# Patient Record
Sex: MALE | Race: WHITE | Employment: FULL TIME | URBAN - METROPOLITAN AREA
[De-identification: names, ages, dates, MRNs, and addresses within clinical notes are randomized per-mention and may not be internally consistent; named-entity substitution may affect disease eponyms.]

---

## 2020-12-26 ENCOUNTER — TRANSFERRED RECORDS (OUTPATIENT)
Dept: HEALTH INFORMATION MANAGEMENT | Facility: CLINIC | Age: 50
End: 2020-12-26

## 2020-12-26 LAB — RETINOPATHY: NORMAL

## 2021-04-03 ENCOUNTER — OFFICE VISIT (OUTPATIENT)
Dept: URGENT CARE | Facility: URGENT CARE | Age: 51
End: 2021-04-03
Payer: COMMERCIAL

## 2021-04-03 VITALS
HEIGHT: 67 IN | SYSTOLIC BLOOD PRESSURE: 110 MMHG | RESPIRATION RATE: 20 BRPM | DIASTOLIC BLOOD PRESSURE: 76 MMHG | BODY MASS INDEX: 26.97 KG/M2 | HEART RATE: 94 BPM | WEIGHT: 171.8 LBS | TEMPERATURE: 98.3 F | OXYGEN SATURATION: 96 %

## 2021-04-03 DIAGNOSIS — E11.69 TYPE 2 DIABETES MELLITUS WITH OTHER SPECIFIED COMPLICATION, WITHOUT LONG-TERM CURRENT USE OF INSULIN (H): ICD-10-CM

## 2021-04-03 DIAGNOSIS — R55 VASOVAGAL SYNCOPE: Primary | ICD-10-CM

## 2021-04-03 LAB
ANION GAP SERPL CALCULATED.3IONS-SCNC: 2 MMOL/L (ref 3–14)
BUN SERPL-MCNC: 14 MG/DL (ref 7–30)
CALCIUM SERPL-MCNC: 9 MG/DL (ref 8.5–10.1)
CHLORIDE SERPL-SCNC: 100 MMOL/L (ref 94–109)
CO2 SERPL-SCNC: 28 MMOL/L (ref 20–32)
CREAT SERPL-MCNC: 0.82 MG/DL (ref 0.66–1.25)
GFR SERPL CREATININE-BSD FRML MDRD: >90 ML/MIN/{1.73_M2}
GLUCOSE SERPL-MCNC: 396 MG/DL (ref 70–99)
HBA1C MFR BLD: >15 % (ref 0–5.6)
POTASSIUM SERPL-SCNC: 4.3 MMOL/L (ref 3.4–5.3)
SODIUM SERPL-SCNC: 130 MMOL/L (ref 133–144)
TSH SERPL DL<=0.005 MIU/L-ACNC: 1.26 MU/L (ref 0.4–4)

## 2021-04-03 PROCEDURE — 83036 HEMOGLOBIN GLYCOSYLATED A1C: CPT | Performed by: INTERNAL MEDICINE

## 2021-04-03 PROCEDURE — 99205 OFFICE O/P NEW HI 60 MIN: CPT | Performed by: INTERNAL MEDICINE

## 2021-04-03 PROCEDURE — 93000 ELECTROCARDIOGRAM COMPLETE: CPT | Performed by: INTERNAL MEDICINE

## 2021-04-03 PROCEDURE — 84443 ASSAY THYROID STIM HORMONE: CPT | Performed by: INTERNAL MEDICINE

## 2021-04-03 PROCEDURE — 36415 COLL VENOUS BLD VENIPUNCTURE: CPT | Performed by: INTERNAL MEDICINE

## 2021-04-03 PROCEDURE — 80048 BASIC METABOLIC PNL TOTAL CA: CPT | Performed by: INTERNAL MEDICINE

## 2021-04-03 ASSESSMENT — MIFFLIN-ST. JEOR: SCORE: 1597.91

## 2021-04-03 NOTE — PROGRESS NOTES
"    Assessment & Plan     Vasovagal syncope  Considered extensive differential diagnosis of this type of fainting event including seizure, vasovagal syncope, cardiac arrhythmia, myocardial ischemia, valve disease, other structural heart disease, carotid disease, VBI, dehydration.  The history is very consistent with a vasovagal event.  Investigated for underlying metabolic imbalances that could contribute and identified significant hyperglycemia with pseudohyponatremia.  This is a new diagnosis of type 2 diabetes mellitus.  Fortunately, there is no evidence of structural heart disease or cardiac arrhythmia.  Will start metformin.  He has a f/u appointment already scheduled with Dr. Zimmer on 4/8.  Will get some diabetes labs today. Since he isn't fasting, I have advised that he come in fasting for his visit on 4/8.    - EKG 12-lead complete w/read - Clinics  - Basic metabolic panel  (Ca, Cl, CO2, Creat, Gluc, K, Na, BUN)    Type 2 diabetes mellitus with other specified complication, without long-term current use of insulin (H)  - metFORMIN (GLUCOPHAGE) 500 MG tablet; Take 1 tablet (500 mg) by mouth 2 times daily (with meals)  - Hemoglobin A1c  - TSH with free T4 reflex      Ashu Chatterjee MD  [facility] URGENT CARE [facility]    Subjective     HPI     He was standing outside having a conversation with a friend when he passed out.  States that he had been \"up all night\" because he is a shift worker (works third shift).  His typical pattern is to go to sleep around 6:30 AM when he comes home and then wakes around 1:00 PM and then might nap in the later afternoon.  States that he hadn't consumed too much to eat in the past 16 hours (other than some soup and Crystal Lite).  He typically is eating once per day.  Will consume caffeinated beverage.      Prior to fainting, he denies any prodromal symptoms.  Denies chest pain, shortness of breath, palpitations, fluttering.  No diaphoresis, blurry vision or nausea.  " "States that he was unconscious very briefly on both occasions that he fainted. He regained full alertness within a minute or less. No convulsive movements, no loss of bowel or bladder continence.  He subsequently had something to eat.  States that right now he feels fine.     Exercise is primarily at work; he is a  and there is a lot of walking in that job.      PMH:  Negative for chronic conditions   MEDS: taking keerthi    Review of Systems   CONSTITUTIONAL: NEGATIVE for fever, chills, change in weight  EYES: NEGATIVE for vision changes or irritation  ENT/MOUTH: NEGATIVE for ear, mouth and throat problems  RESP: NEGATIVE for significant cough or SOB  CV: NEGATIVE for chest pain, palpitations or peripheral edema  GI: NEGATIVE for nausea, abdominal pain, heartburn, or change in bowel habits  NEURO: NEGATIVE for convulsive movements, disorientation, speech problems, loss of coordination, weakness, numbness, tremor, memory problems  ENDOCRINE: NEGATIVE for temperature intolerance, skin/hair changes      Objective    /76   Pulse 94   Temp 98.3  F (36.8  C) (Tympanic)   Resp 20   Ht 1.702 m (5' 7\")   Wt 77.9 kg (171 lb 12.8 oz)   SpO2 96%   BMI 26.91 kg/m    Body mass index is 26.91 kg/m .  Physical Exam   GENERAL APPEARANCE: healthy, alert and no distress  EYES: Eyes grossly normal to inspection, PERRL, conjunctivae and sclerae normal and brief torsional nystagmus with upward gaze to the right  HENT: ear canals and TM's normal and nose and mouth without ulcers or lesions  NECK: no adenopathy, no asymmetry, masses, or scars and thyroid normal to palpation  RESP: lungs clear to auscultation - no rales, rhonchi or wheezes  CV: regular rates and rhythm, normal S1 S2, no S3 or S4 and no murmur, click or rub  ABDOMEN: soft, nontender, without hepatosplenomegaly or masses and bowel sounds normal  NEURO: CN 2-12 intact, strength 5/5 throughout, normal DTRs in patellar and biceps tendons bilaterally, " sensation grossly intact, no ataxia on finger-to-nose testing, mental status alert and oriented x 3    EKG - Reviewed and interpreted by me appears normal, NSR, normal axis, normal intervals, no acute ST/T changes c/w ischemia, no LVH by voltage criteria, unchanged from previous tracings  Results for orders placed or performed in visit on 04/03/21 (from the past 24 hour(s))   Basic metabolic panel  (Ca, Cl, CO2, Creat, Gluc, K, Na, BUN)   Result Value Ref Range    Sodium 130 (L) 133 - 144 mmol/L    Potassium 4.3 3.4 - 5.3 mmol/L    Chloride 100 94 - 109 mmol/L    Carbon Dioxide 28 20 - 32 mmol/L    Anion Gap 2 (L) 3 - 14 mmol/L    Glucose 396 (H) 70 - 99 mg/dL    Urea Nitrogen 14 7 - 30 mg/dL    Creatinine 0.82 0.66 - 1.25 mg/dL    GFR Estimate >90 >60 mL/min/[1.73_m2]    GFR Estimate If Black >90 >60 mL/min/[1.73_m2]    Calcium 9.0 8.5 - 10.1 mg/dL

## 2021-04-03 NOTE — Clinical Note
Yesenia Zimmer,    This gentleman sees you on 4/8 for a physical.  He came in today for syncope and we diagnosed new onset diabetes (presumably type 2).  He has no PMH and is on no medications.  I got some labs but couldn't do all of the diabetes labs.  Told him to come in fasting on 4/8 but I also thought that you might want him to come in for a lab only prior to that.    Thank you.  Ashu Chatterjee MD

## 2021-04-04 ENCOUNTER — NURSE TRIAGE (OUTPATIENT)
Dept: NURSING | Facility: CLINIC | Age: 51
End: 2021-04-04

## 2021-04-04 NOTE — PATIENT INSTRUCTIONS
Your blood sugar of 396 indicates a diagnosis of type 2 diabetes mellitus.    This is a likely contributor to your fainting spell.  Will be important to get your blood sugars controlled to prevent other problems from developing.  Start a medication called metformin to begin the process of reducing blood sugar.  Additional blood tests today.  For you visit with Dr. Zimmer on April 8, I suggest that you come in fasting (nothing to eat or drink other than water and/or black coffee x 12 hours prior to the appointment).  This will allow for some other important testing to be completed.

## 2021-04-04 NOTE — TELEPHONE ENCOUNTER
"Morro asks for his lab results. FNA read him message from  Provider:    \"When he calls back, please let him know that the hemoglobin A1C test confirms the diagnosis of diabetes mellitus.  Most important next step will be follow-up on April 8th.    In the meantime, I would advise that he eliminate sugar from his diet and increase his intake of protein (meats, dairy, eggs, legumes) and his intake of vegetables.  Fruits are a source of sugar so I would suggest limiting fruits.  In terms of starches/carbohydrates, I would suggest that he reduce or eliminate eating any white bread products and reduce his intake of breads.       Thank you.  Ashu Chatterjee MD\"    He verbalized understanding and will see PCP on 4/8.    Ale Dupree RN/[facility] Nurse Advisor        Additional Information    Health Information question, no triage required and triager able to answer question    Protocols used: INFORMATION ONLY CALL-A-AH    "

## 2021-04-06 PROBLEM — E11.65 TYPE 2 DIABETES MELLITUS WITH HYPERGLYCEMIA, WITHOUT LONG-TERM CURRENT USE OF INSULIN (H): Status: ACTIVE | Noted: 2021-04-06

## 2021-04-08 ENCOUNTER — OFFICE VISIT (OUTPATIENT)
Dept: INTERNAL MEDICINE | Facility: CLINIC | Age: 51
End: 2021-04-08
Payer: COMMERCIAL

## 2021-04-08 VITALS
TEMPERATURE: 97.5 F | SYSTOLIC BLOOD PRESSURE: 120 MMHG | RESPIRATION RATE: 16 BRPM | HEIGHT: 66 IN | HEART RATE: 72 BPM | BODY MASS INDEX: 27.64 KG/M2 | DIASTOLIC BLOOD PRESSURE: 82 MMHG | WEIGHT: 172 LBS | OXYGEN SATURATION: 99 %

## 2021-04-08 DIAGNOSIS — Z12.11 ENCOUNTER FOR SCREENING FOR COLORECTAL MALIGNANT NEOPLASM: ICD-10-CM

## 2021-04-08 DIAGNOSIS — E11.65 TYPE 2 DIABETES MELLITUS WITH HYPERGLYCEMIA, WITHOUT LONG-TERM CURRENT USE OF INSULIN (H): ICD-10-CM

## 2021-04-08 DIAGNOSIS — Z00.00 ROUTINE GENERAL MEDICAL EXAMINATION AT A HEALTH CARE FACILITY: Primary | ICD-10-CM

## 2021-04-08 DIAGNOSIS — Z12.12 ENCOUNTER FOR SCREENING FOR COLORECTAL MALIGNANT NEOPLASM: ICD-10-CM

## 2021-04-08 DIAGNOSIS — Z23 ENCOUNTER FOR IMMUNIZATION: ICD-10-CM

## 2021-04-08 LAB
CHOLEST SERPL-MCNC: 171 MG/DL
CREAT UR-MCNC: 116 MG/DL
HDLC SERPL-MCNC: 43 MG/DL
LDLC SERPL CALC-MCNC: 109 MG/DL
MICROALBUMIN UR-MCNC: 16 MG/L
MICROALBUMIN/CREAT UR: 13.71 MG/G CR (ref 0–17)
NONHDLC SERPL-MCNC: 128 MG/DL
TRIGL SERPL-MCNC: 98 MG/DL

## 2021-04-08 PROCEDURE — 80061 LIPID PANEL: CPT | Performed by: INTERNAL MEDICINE

## 2021-04-08 PROCEDURE — 99396 PREV VISIT EST AGE 40-64: CPT | Mod: 25 | Performed by: INTERNAL MEDICINE

## 2021-04-08 PROCEDURE — 82043 UR ALBUMIN QUANTITATIVE: CPT | Performed by: INTERNAL MEDICINE

## 2021-04-08 PROCEDURE — 90715 TDAP VACCINE 7 YRS/> IM: CPT | Performed by: INTERNAL MEDICINE

## 2021-04-08 PROCEDURE — 99214 OFFICE O/P EST MOD 30 MIN: CPT | Mod: 25 | Performed by: INTERNAL MEDICINE

## 2021-04-08 PROCEDURE — 36415 COLL VENOUS BLD VENIPUNCTURE: CPT | Performed by: INTERNAL MEDICINE

## 2021-04-08 PROCEDURE — 99207 PR FOOT EXAM NO CHARGE: CPT | Mod: 25 | Performed by: INTERNAL MEDICINE

## 2021-04-08 PROCEDURE — 90471 IMMUNIZATION ADMIN: CPT | Performed by: INTERNAL MEDICINE

## 2021-04-08 RX ORDER — GLIPIZIDE 5 MG/1
5 TABLET ORAL
Qty: 30 TABLET | Refills: 1 | Status: SHIPPED | OUTPATIENT
Start: 2021-04-08 | End: 2021-05-06

## 2021-04-08 ASSESSMENT — MIFFLIN-ST. JEOR: SCORE: 1582.94

## 2021-04-08 NOTE — PATIENT INSTRUCTIONS
- Metformin take 1,000mg twice a day (so 2,000mg total daily dose)  - Glipizide prescribed. Take with food.  - Diabetic educator referral placed  - Sign-up for [email or web address] and I will send you a message on [email or web address] when I am able to look at the results of your tests from today    Preventive Health Recommendations  Male Ages 50 - 64    Yearly exam:             See your health care provider every year in order to  o   Review health changes.   o   Discuss preventive care.    o   Review your medicines if your doctor has prescribed any.     Have a cholesterol test every 5 years, or more frequently if you are at risk for high cholesterol/heart disease.     Have a diabetes test (fasting glucose) every three years. If you are at risk for diabetes, you should have this test more often.     Have a colonoscopy at age 50, or have a yearly FIT test (stool test). These exams will check for colon cancer.      Talk with your health care provider about whether or not a prostate cancer screening test (PSA) is right for you.    You should be tested each year for STDs (sexually transmitted diseases), if you re at risk.     Shots: Get a flu shot each year. Get a tetanus shot every 10 years.     Nutrition:    Eat at least 5 servings of fruits and vegetables daily.     Eat whole-grain bread, whole-wheat pasta and brown rice instead of white grains and rice.     Get adequate Calcium and Vitamin D.     Lifestyle    Exercise for at least 150 minutes a week (30 minutes a day, 5 days a week). This will help you control your weight and prevent disease.     Limit alcohol to one drink per day.     No smoking.     Wear sunscreen to prevent skin cancer.     See your dentist every six months for an exam and cleaning.     See your eye doctor every 1 to 2 years.

## 2021-04-08 NOTE — LETTER
[facility]  [address] [address]  Phone: [phone]   4/8/2021      Morro S Trent  [address] [facility] [address]        Dear Mr. Morro Newman:    I am writing to inform you of the results of the laboratory tests you had done recently. Your urine study shows that you do not have any protein in your urine, which is good news! Your cholesterol looks pretty good too. Please be sure to establish care with a provider at the [facility] some time in May 2021.    Thank you for allowing me to participate in your care. If you have any further questions or problems, please contact me via our nurse line at [phone]. An even easier way to get ahold of members of your care team is through [employer], which you can sign up for at [email or web address] using your personalized activation code [email or web address] [employer] is not only a great way to communicate with us, but also allows you to see your full results.      Sincerely,        Arnaldo Manuel MD, MPH  Department of Internal Medicine  [facility]

## 2021-04-08 NOTE — PROGRESS NOTES
SUBJECTIVE:   CC: Morro Newman is an 50 year old male who presents for preventive health visit.     Patient has been advised of split billing requirements and indicates understanding: Yes     Answers for HPI/ROS submitted by the patient on 4/8/2021   Annual Exam:  Frequency of exercise:: None  Getting at least 3 servings of Calcium per day:: Yes  Diet:: Regular (no restrictions)  Taking medications regularly:: No  Medication side effects:: None  Bi-annual eye exam:: Yes  Dental care twice a year:: Yes  Sleep apnea or symptoms of sleep apnea:: None  Additional concerns today:: No  Barriers to taking medications:: None    Morro presents today for a physical exam. We also discussed the following issues:    -New diagnosis of diabetes: Morro had an incident where he passed out. He presented to  over the weekend and labs showed uncontrolled diabetes with an A1c >15%. This is a new diagnosis. He reports he does not interact with the healthcare system regularly. His last interaction was to get a tetanus shot ~20 years ago. He reports drinking a lot of soda, Monster, eating candy, etc. He has felt dehydrated. He is tolerating the metformin well. He has ruled out injecting himself with anything at this time and is not interested in insulin therapy.    -HCM: Never heard of a colonoscopy, not UTD on most/all shots, only lab work was from  this past weekend    Today's PHQ-2 Score: 0  PHQ-2 Score: 0    PHQ-2 ( 1999 Pfizer) 4/8/2021   Q1: Little interest or pleasure in doing things 0   Q2: Feeling down, depressed or hopeless 0   PHQ-2 Score 0   Q1: Little interest or pleasure in doing things Not at all   Q2: Feeling down, depressed or hopeless Not at all   PHQ-2 Score 0     Abuse: Current or Past(Physical, Sexual or Emotional)- No  Do you feel safe in your environment? Yes    Social History     Tobacco Use     Smoking status: Light Tobacco Smoker     Smokeless tobacco: Never Used     Tobacco comment: Once a month on  "average.   Substance Use Topics     Alcohol use: Not on file     If you drink alcohol do you typically have >3 drinks per day or >7 drinks per week? No                      Last PSA: No results found for: PSA    Reviewed orders with patient. Reviewed health maintenance and updated orders accordingly - Yes    Labs reviewed in EPIC    Reviewed and updated as needed this visit by clinical staff  Tobacco  Allergies  Meds  Problems  Med Hx  Surg Hx  Fam Hx          Reviewed and updated as needed this visit by Provider  Tobacco  Allergies  Meds  Problems  Med Hx  Surg Hx  Fam Hx           ROS:  CONSTITUTIONAL: NEGATIVE for fever, chills, positive for change in weight  INTEGUMENTARY/SKIN: NEGATIVE for worrisome rashes, moles or lesions  EYES: NEGATIVE for vision changes or irritation  ENT: NEGATIVE for ear, mouth and throat problems  RESP: NEGATIVE for significant cough or SOB  CV: NEGATIVE for chest pain, palpitations or peripheral edema  GI: NEGATIVE for nausea, abdominal pain, heartburn, or change in bowel habits   male: negative for dysuria, hematuria, positive for polyuria  MUSCULOSKELETAL: NEGATIVE for significant arthralgias or myalgia  NEURO: NEGATIVE for weakness, dizziness or paresthesias except recent fall  PSYCHIATRIC: NEGATIVE for changes in mood or affect    OBJECTIVE:   /82   Pulse 72   Temp 97.5  F (36.4  C) (Temporal)   Resp 16   Ht 1.676 m (5' 6\")   Wt 78 kg (172 lb)   SpO2 99%   BMI 27.76 kg/m       EXAM:  GENERAL: Alert and in no distress.  EYES: Conjunctivae/corneas clear. EOMs grossly intact  HENT: NC/AT, facies symmetric. Neck supple.  RESP: CTAB. No w/r/r.  CV: RRR, no m/r/g.  GI: NT, ND, without rebound or guarding, no CVA tenderness  MSK: No cyanosis, clubbing or edema noted bilaterally in upper and/or lower extremities  FOOT EXAM: No ulcers/sores. DP pulses 2+ bilaterally. Sensation to monofilament intact.  SKIN: No significant ulcers, lesions or rashes on the " visualized portions of the skin  NEURO: Alert. Oriented. Gait normal. Sensation grossly WNL.  PSYCH: Linear thought process. Speech normal rate and volume. No tangential thoughts, hallucinations, or delusions.    Diagnostic Test Results: Labs reviewed in Epic    ASSESSMENT/PLAN:   1. Routine general medical examination at a health care facility  Reviewed PMH. Discussed healthcare maintenance issues, including cancer screenings (colonoscopies), relevant immunizations, and cardiac risk factor screenings such as for cholesterol, HTN, and DM.    2. Type 2 diabetes mellitus with hyperglycemia, without long-term current use of insulin (H)  I had a long discussion with Morro today regarding this new diagnosis. I explained what diabetes is, why we care so much about it, short and long term risks of undertreating it, and how we treat it. Throughout the discussion, he was adamant that he was not interested in any injectable medications (including insulin) at this time. I explained that he is in charge of his body, but that my medical recommendation is to start lantus insulin to help bring down his BG. He again declined. He was willing to uptitrate the metformin and start glipizide. Discussed the risks of hypoglycemia with glipizide and how to cope with it, though don't anticipate that to be an issue given his BG and A1c. Discussed refarral to diabetic education and he accepted. Foot exam normal today. Eye exam referral placed. We'll check UPC and lipids today. Of note, Morro states he lives quite close to the [facility] and would prefer to get care there. I explained that he will need frequent, consistent follow-up over the next few months as we try to get his diabetes under control and that he should establish at a location where he feels that is most feasible. He plans to reach out to [facility] to schedule a follow-up appt with a provider there in 4 weeks. I recommended he check his BG and bring in that log to that  "provider.  - Lipid Profile  - FOOT EXAM  NO CHARGE [93558.114]  - EYE ADULT REFERRAL; Future  - Albumin Random Urine Quantitative with Creat Ratio  - AMBULATORY ADULT DIABETES EDUCATOR REFERRAL; Future  - metFORMIN (GLUCOPHAGE) 1000 MG tablet; Take 1 tablet (1,000 mg) by mouth 2 times daily (with meals)  Dispense: 180 tablet; Refill: 3  - blood glucose monitoring (NO BRAND SPECIFIED) meter device kit; Use to test blood sugar as directed.  Dispense: 1 kit; Refill: 0  - glipiZIDE (GLUCOTROL) 5 MG tablet; Take 1 tablet (5 mg) by mouth daily with food  Dispense: 30 tablet; Refill: 1    3. Encounter for screening for colorectal malignant neoplasm  Explained what a colonoscopy is and why we recommend them. He agreed to proceed. Referral placed.  - GASTROENTEROLOGY ADULT REF PROCEDURE ONLY; Future    4. Encounter for immunization  Needs multiple vaccines, but agreed to Tdap today.  - TDAP VACCINE (Adacel, Boostrix)  [[id number]]    Patient has been advised of split billing requirements and indicates understanding: Yes     COUNSELING:  Reviewed preventive health counseling, as reflected in patient instructions       Regular exercise       Healthy diet/nutrition       Immunizations    Vaccinated for: TDAP       Colon cancer screening    Estimated body mass index is 27.76 kg/m  as calculated from the following:    Height as of this encounter: 1.676 m (5' 6\").    Weight as of this encounter: 78 kg (172 lb).    He reports that he has been smoking. He has never used smokeless tobacco.     Tobacco Cessation Action Plan:   Information offered: Patient not interested at this time    Counseling Resources:  ATP IV Guidelines  Pooled Cohorts Equation Calculator  FRAX Risk Assessment  ICSI Preventive Guidelines  Dietary Guidelines for Americans, 2010  USDA's MyPlate  ASA Prophylaxis  Lung CA Screening    Arnaldo Zimmer MD  [facility]  "

## 2021-04-29 ENCOUNTER — PATIENT OUTREACH (OUTPATIENT)
Dept: EDUCATION SERVICES | Facility: CLINIC | Age: 51
End: 2021-04-29
Payer: COMMERCIAL

## 2021-04-29 DIAGNOSIS — E11.65 TYPE 2 DIABETES MELLITUS WITH HYPERGLYCEMIA, WITHOUT LONG-TERM CURRENT USE OF INSULIN (H): ICD-10-CM

## 2021-04-29 PROCEDURE — G0108 DIAB MANAGE TRN  PER INDIV: HCPCS | Mod: 95

## 2021-04-29 RX ORDER — BLOOD SUGAR DIAGNOSTIC
STRIP MISCELLANEOUS
Qty: 100 STRIP | Refills: 11 | Status: SHIPPED | OUTPATIENT
Start: 2021-04-29 | End: 2021-08-03

## 2021-04-29 RX ORDER — LANCETS
EACH MISCELLANEOUS
Qty: 100 EACH | Refills: 6 | Status: SHIPPED | OUTPATIENT
Start: 2021-04-29 | End: 2021-08-03

## 2021-04-29 NOTE — PATIENT INSTRUCTIONS
Diabetes Support Resources:  Websites: American Diabetes Association: www.diabetes.org     Aim for 60 grams of carb per meal and 15 grams of carb per snack.     Check your blood sugar twice daily - when you wake up and before bed. Can occasionally check after meals occasionally.     Signs/symptoms of low blood sugar include (but are not limited to): sweating, shaking, feeling dizzy or weak, extreme hunger, headache, feeling crabby or confused, numbness or tingling of mouth/lips.  If you have these symptoms check your blood sugar if you can and then consume carbohydrate (sugar)  This is a helpful reminder on how to treat a blood sugar if you are low:  If your blood sugar is < 70 mg/dL, consume 15 grams of fast-acting carbohydrate (4 glucose tabs OR 4 oz juice or non-diet pop OR 2 Tbsp dried fruit OR 5-7 lifesavers OR 1 Tbsp sugar) and wait 15 minutes. Check blood sugar again and if not rising, repeat.  If your blood sugar is < 50 mg/dL, consume 30 grams of fast-acting carbohydrate (8 glucose tabs OR 8 oz juice or non-diet pop OR 4 Tbsp dried fruit OR 10-14 lifesavers OR 2 Tbsp sugar) and wait 15 minutes. Check blood sugar again and if not rising, repeat.       Bring blood glucose meter and logbook with you to all doctor and follow-up appointments.    Diabetes Education Telephone Visit Follow-up:    We realize your time is valuable and your health is important! We offer a convenient Telephone Visit follow up! It s a quick way to check in for a medication dose adjustment without having to come back to clinic as soon.    Telephone Visits are often covered by insurance. Please check with your insurance plan to see if this type of visit is covered. If not, the cost is less expensive than an office visit:      Up to 10 minutes (Code 91329): $30    11-20 minutes (Code 49938): $59    More than 20 minutes (Code 35779): $85    Talk with your Diabetes Educator if you want to learn more.      [facility]  Nutrition Services:  For Your Diabetes Education and Nutrition Appointments Call:  [phone]   For Diabetes Education or Nutrition Related Questions:   Phone: [phone]  Send [employer] Message   If you need a medication refill please contact your pharmacy. Please allow 3 business days for your refills to be completed.

## 2021-04-29 NOTE — PROGRESS NOTES
"    Diabetes Self-Management Education & Support  Type of Service: Telephone Visit    How would patient like to obtain AVS? De[place]        Presents for: Initial Assessment for new diagnosis    SUBJECTIVE/OBJECTIVE:  Presents for: Initial Assessment for new diagnosis  Accompanied by: Self  Diabetes education in the past 24mo: No  Focus of Visit: Monitoring  Diabetes type: Type 2  Date of diagnosis: new dx  Disease course: Improving  How confident are you filling out medical forms by yourself:: Not Assessed  Diabetes management related comments/concerns: Meter does not always work and says he does not always get enough blood.  Difficulty affording diabetes medication?: No  Difficulty affording diabetes testing supplies?: No  Cultural Influences/Ethnic Background:      Diabetes Symptoms & Complications:  Polydipsia: Yes  Symptom course: Improving  Weight trend: Stable  Complications assessed today?: Yes  Autonomic neuropathy: No  CVA: No  Heart disease: No  Nephropathy: No  Peripheral neuropathy: No  Peripheral Vascular Disease: No  Retinopathy: No    Patient Problem List and Family Medical History reviewed for relevant medical history, current medical status, and diabetes risk factors.    Vitals:  There were no vitals taken for this visit.  Estimated body mass index is 27.76 kg/m  as calculated from the following:    Height as of 4/8/21: 1.676 m (5' 6\").    Weight as of 4/8/21: 78 kg (172 lb).   Last 3 BP:   BP Readings from Last 3 Encounters:   04/08/21 120/82   04/03/21 110/76       History   Smoking Status     Light Tobacco Smoker   Smokeless Tobacco     Never Used     Comment: Once a month on average.       Labs:  Lab Results   Component Value Date    A1C >15.0 04/03/2021     Lab Results   Component Value Date     04/03/2021     Lab Results   Component Value Date     04/08/2021     HDL Cholesterol   Date Value Ref Range Status   04/08/2021 43 >39 mg/dL Final   ]  GFR Estimate   Date Value " Ref Range Status   04/03/2021 >90 >60 mL/min/[1.73_m2] Final     Comment:     Non  GFR Calc  Starting 12/18/2018, serum creatinine based estimated GFR (eGFR) will be   calculated using the Chronic Kidney Disease Epidemiology Collaboration   (CKD-EPI) equation.       GFR Estimate If Black   Date Value Ref Range Status   04/03/2021 >90 >60 mL/min/[1.73_m2] Final     Comment:      GFR Calc  Starting 12/18/2018, serum creatinine based estimated GFR (eGFR) will be   calculated using the Chronic Kidney Disease Epidemiology Collaboration   (CKD-EPI) equation.       Lab Results   Component Value Date    CR 0.82 04/03/2021     No results found for: MICROALBUMIN    Healthy Eating:  Healthy Eating Assessed Today: Yes  Cultural/Buddhist diet restrictions?: No  Meal planning/habits: Avoiding sweets  Meals include: Breakfast, Lunch, Dinner  Breakfast: fiber cereal and HB eggs  Lunch: almonds/peanuts  Dinner: shredded chicken and cheese  Snacks: beef jerkey, bananas, mangos  Beverages: Water, Coffee, Diet soda  Has patient met with a dietitian in the past?: No      Was drinking pop and candy before. Using sugar free creamer and diet sodas now. Buying everything unsweetened.  No monster drinks anymore.     Eats vegetables hear and there. Likes beans and rice and chicken or burger without the bun.     Being Active:  Being Active Assessed Today: Yes  Exercise:: Yes(walks 3-5 miles per day with work and during the day)    Monitoring:  Monitoring Assessed Today: Yes  Did patient bring glucose meter to appointment? : Yes  Blood Glucose Meter: Accu-chek  Times checking blood sugar at home (number): 2  Times checking blood sugar at home (per): Day  Blood glucose trend: Decreasing      131 this morning. 150s after eating usually. 114 mg/dL on call now.   128 yesterday morning.     Taking Medications:  Diabetes Medication(s)     Biguanides       metFORMIN (GLUCOPHAGE) 1000 MG tablet    Take 1 tablet (1,000  mg) by mouth 2 times daily (with meals)    Sulfonylureas       glipiZIDE (GLUCOTROL) 5 MG tablet    Take 1 tablet (5 mg) by mouth daily with food          Taking Medication Assessed Today: Yes  Current Treatments: Oral Medication (taken by mouth)  Problems taking diabetes medications regularly?: No  Diabetes medication side effects?: No    Problem Solving:  Problem Solving Assessed Today: Yes  Is the patient at risk for hypoglycemia?: Yes  Hypoglycemia Frequency: Never      Reducing Risks:  Reducing Risks Assessed Today: No    Healthy Coping:  Healthy Coping Assessed Today: Yes  Emotional response to diabetes: Ready to learn, Concern for health and well-being  Informal Support system:: Family, Spouse  Stage of change: ACTION (Actively working towards change)  Patient Activation Measure Survey Score:  No flowsheet data found.    Diabetes knowledge and skills assessment:   Patient is knowledgeable in diabetes management concepts related to: Monitoring    Patient needs further education on the following diabetes management concepts: Healthy Eating, Being Active, Taking Medication, Problem Solving, Reducing Risks and Healthy Coping    Based on learning assessment above, most appropriate setting for further diabetes education would be: Group class or Individual setting.      INTERVENTIONS:    Education provided today on:  AADE Self-Care Behaviors:  Healthy Eating: carbohydrate counting, consistency in amount, composition, and timing of food intake, portion control and label reading. Praised him on all his diet changes! Encouraged him to keep these up. He reports feeling better and okay with these changes. He wants to avoid insulin.   Being Active: relationship to blood glucose  Monitoring: purpose, proper technique, log and interpret results, individual blood glucose targets and frequency of monitoring  Taking Medication: action of prescribed medication and side effects of prescribed medications  Problem Solving: low  blood glucose - causes, signs/symptoms, treatment and prevention, carrying a carbohydrate source at all times and when to call health care provider    Opportunities for ongoing education and support in diabetes-self management were discussed.    Pt verbalized understanding of concepts discussed and recommendations provided today.       Education Materials Provided:  [facility] Understanding Diabetes Booklet will be mailed      ASSESSMENT:  Morro has made a number of positive changes since his dx. He has cut out the sugary drinks and candy. He is choosing higher fiber foods and eating protein with his meals. Praised him on all this!    His blood sugars are in target for before meals and after meals. Praised him on this. Focused on some tips for getting enough blood like milking the finger and maybe using a higher setting on his lancing device.     He was wondering about an organic insulin powder. I have never heard of this so I told him to bring it to his doctor appt next week.       Patient's most recent   Lab Results   Component Value Date    A1C >15.0 04/03/2021    is not meeting goal of <8.0    PLAN  See Patient Instructions for co-developed, patient-stated behavior change goals.  Ordered test strips and lancets for his meter (has an accu chek guide).   Check BG BID after waking up and before bed. Can occasionally check 2 hrs after a meal if helpful. Always check BG if sx of a low. Goal is  before meals and < 180 if 2 hrs after a meal.   Pt to contact us or his PCP if having low BG's.   No changes made to his Metformin or Glipizide today.   Bring organic insulin powder to the doctor next week to look at  AVS printed and provided to patient today. See Follow-Up section for recommended follow-up.    LAURA Bergeron CDE    Time Spent: 46 minutes  Encounter Type: Individual    Any diabetes medication dose changes were made via the CDE Protocol and Collaborative Practice Agreement with the patient's  referring provider. A copy of this encounter was shared with the provider.

## 2021-05-06 ENCOUNTER — OFFICE VISIT (OUTPATIENT)
Dept: FAMILY MEDICINE | Facility: CLINIC | Age: 51
End: 2021-05-06
Payer: COMMERCIAL

## 2021-05-06 VITALS
DIASTOLIC BLOOD PRESSURE: 82 MMHG | RESPIRATION RATE: 16 BRPM | TEMPERATURE: 97 F | BODY MASS INDEX: 27.32 KG/M2 | SYSTOLIC BLOOD PRESSURE: 126 MMHG | HEART RATE: 78 BPM | OXYGEN SATURATION: 100 % | HEIGHT: 66 IN | WEIGHT: 170 LBS

## 2021-05-06 DIAGNOSIS — N52.1 ERECTILE DYSFUNCTION DUE TO DISEASES CLASSIFIED ELSEWHERE: ICD-10-CM

## 2021-05-06 DIAGNOSIS — E11.65 TYPE 2 DIABETES MELLITUS WITH HYPERGLYCEMIA, WITHOUT LONG-TERM CURRENT USE OF INSULIN (H): Primary | ICD-10-CM

## 2021-05-06 PROCEDURE — 99207 PR FOOT EXAM NO CHARGE: CPT | Performed by: FAMILY MEDICINE

## 2021-05-06 PROCEDURE — 99214 OFFICE O/P EST MOD 30 MIN: CPT | Performed by: FAMILY MEDICINE

## 2021-05-06 RX ORDER — SILDENAFIL 100 MG/1
50-100 TABLET, FILM COATED ORAL DAILY PRN
Qty: 6 TABLET | Refills: 1 | Status: SHIPPED | OUTPATIENT
Start: 2021-05-06 | End: 2023-06-12

## 2021-05-06 RX ORDER — GLIPIZIDE 5 MG/1
5 TABLET ORAL
Qty: 30 TABLET | Refills: 1 | Status: SHIPPED | OUTPATIENT
Start: 2021-05-06 | End: 2021-06-01

## 2021-05-06 SDOH — ECONOMIC STABILITY: FOOD INSECURITY: WITHIN THE PAST 12 MONTHS, YOU WORRIED THAT YOUR FOOD WOULD RUN OUT BEFORE YOU GOT MONEY TO BUY MORE.: NOT ASKED

## 2021-05-06 SDOH — HEALTH STABILITY: MENTAL HEALTH: HOW OFTEN DO YOU HAVE A DRINK CONTAINING ALCOHOL?: MONTHLY OR LESS

## 2021-05-06 SDOH — ECONOMIC STABILITY: INCOME INSECURITY: HOW HARD IS IT FOR YOU TO PAY FOR THE VERY BASICS LIKE FOOD, HOUSING, MEDICAL CARE, AND HEATING?: NOT ASKED

## 2021-05-06 SDOH — HEALTH STABILITY: MENTAL HEALTH: HOW OFTEN DO YOU HAVE 6 OR MORE DRINKS ON ONE OCCASION?: NOT ASKED

## 2021-05-06 SDOH — ECONOMIC STABILITY: TRANSPORTATION INSECURITY
IN THE PAST 12 MONTHS, HAS THE LACK OF TRANSPORTATION KEPT YOU FROM MEDICAL APPOINTMENTS OR FROM GETTING MEDICATIONS?: NOT ASKED

## 2021-05-06 SDOH — ECONOMIC STABILITY: TRANSPORTATION INSECURITY
IN THE PAST 12 MONTHS, HAS LACK OF TRANSPORTATION KEPT YOU FROM MEETINGS, WORK, OR FROM GETTING THINGS NEEDED FOR DAILY LIVING?: NOT ASKED

## 2021-05-06 SDOH — HEALTH STABILITY: MENTAL HEALTH: HOW MANY STANDARD DRINKS CONTAINING ALCOHOL DO YOU HAVE ON A TYPICAL DAY?: 1 OR 2

## 2021-05-06 SDOH — ECONOMIC STABILITY: FOOD INSECURITY: WITHIN THE PAST 12 MONTHS, THE FOOD YOU BOUGHT JUST DIDN'T LAST AND YOU DIDN'T HAVE MONEY TO GET MORE.: NOT ASKED

## 2021-05-06 ASSESSMENT — MIFFLIN-ST. JEOR: SCORE: 1573.86

## 2021-05-06 NOTE — PROGRESS NOTES
"    Assessment & Plan     Type 2 diabetes mellitus with hyperglycemia, without long-term current use of insulin (H)  Blood sugars appear to be under dramatically better control at this time.  I have not made any changes to his medications but he did need a refill on the glipizide today.  He could probably cut his blood sugar checks back to once daily or 4-5 times per week.  I talked to him about the tests we used to measure improvement in diabetes and would suggest follow-up in 2 months with hemoglobin A1c testing at that time.  Other tests from his recent visit in urgent care and at the [facility] were reviewed in detail today.  - FOOT EXAM  - glipiZIDE (GLUCOTROL) 5 MG tablet; Take 1 tablet (5 mg) by mouth daily with food    Erectile dysfunction due to diseases classified elsewhere  Patient reports recent onset of diminished interest and ability in sex.  He was interested in trying Viagra so I issued a prescription for that today with discussion of side effects including but not limited to sudden vision loss.  If this does not help with things then would give consideration to checking hormone levels at the next visit.  - sildenafil (VIAGRA) 100 MG tablet; Take 0.5-1 tablets ( mg) by mouth daily as needed             BMI:   Estimated body mass index is 27.44 kg/m  as calculated from the following:    Height as of this encounter: 1.676 m (5' 6\").    Weight as of this encounter: 77.1 kg (170 lb).   Weight management plan: Discussed healthy diet and exercise guidelines        Return in about 2 months (around 7/6/2021) for Diabetes Check.    Nehemias Huffman MD  [facility] CenterCHARLIE Hooker is a 50 year old who presents for the following health issues     HPI     Diabetes Follow-up    How often are you checking your blood sugar? Two times daily  Blood sugar testing frequency justification:  Adjustment of medication(s)  What time of day are you checking your blood sugars " (select all that apply)?  Before and after meals  Have you had any blood sugars above 200?  Yes (223)  Have you had any blood sugars below 70?  No    What symptoms do you notice when your blood sugar is low?  None    What concerns do you have today about your diabetes? Discuss what are the next options to prevent taking medications     Do you have any of these symptoms? (Select all that apply)  No numbness or tingling in feet.  No redness, sores or blisters on feet.  No complaints of excessive thirst.  No reports of blurry vision.  No significant changes to weight.    Have you had a diabetic eye exam in the last 12 months? Yes-  Location: december 26, 2020        BP Readings from Last 2 Encounters:   05/06/21 126/82   04/08/21 120/82     Hemoglobin A1C (%)   Date Value   04/03/2021 >15.0 (H)     LDL Cholesterol Calculated (mg/dL)   Date Value   04/08/2021 109 (H)                 How many servings of fruits and vegetables do you eat daily?  2-3    On average, how many sweetened beverages do you drink each day (Examples: soda, juice, sweet tea, etc.  Do NOT count diet or artificially sweetened beverages)?   1    How many days per week do you exercise enough to make your heart beat faster? 6    How many minutes a day do you exercise enough to make your heart beat faster? 60 or more    How many days per week do you miss taking your medication? 0    Patient is here for follow-up.  He reports compliance with medications.  Symptoms to suggest hyperglycemia such as excessive thirst and urination have improved.  He is checking blood sugars 1-2 times per day and they range anywhere from a low of 87 to a high of 187 but most of them are around 130 or so.  He denies any specific side effects from the medications.  He has an appointment in approximately 1 month with diabetic education.  He reports making significant changes to his diet including cutting out simple carbohydrates and sugary beverages.    Review of Systems  "  Constitutional, HEENT, cardiovascular, pulmonary, gi and gu systems are negative, except as otherwise noted.      Objective    /82   Pulse 78   Temp 97  F (36.1  C) (Temporal)   Resp 16   Ht 1.676 m (5' 6\")   Wt 77.1 kg (170 lb)   SpO2 100%   BMI 27.44 kg/m    Body mass index is 27.44 kg/m .  Physical Exam   GENERAL: healthy, alert and no distress  EYES: Eyes grossly normal to inspection, PERRL and conjunctivae and sclerae normal  HENT: normal cephalic/atraumatic, nose and mouth without ulcers or lesions, oropharynx clear and oral mucous membranes moist  NECK: no adenopathy, no asymmetry, masses, or scars and thyroid normal to palpation  RESP: lungs clear to auscultation - no rales, rhonchi or wheezes  CV: regular rate and rhythm, normal S1 S2, no S3 or S4, no murmur, click or rub, no peripheral edema and peripheral pulses strong  MS: no gross musculoskeletal defects noted, no edema  NEURO: Normal strength and tone, mentation intact and speech normal  PSYCH: mentation appears normal, affect normal/bright  Diabetic foot exam: normal DP and PT pulses, no trophic changes or ulcerative lesions, normal sensory exam and normal monofilament exam    Office Visit on 04/08/2021   Component Date Value Ref Range Status     Cholesterol 04/08/2021 171  <200 mg/dL Final     Triglycerides 04/08/2021 98  <150 mg/dL Final     HDL Cholesterol 04/08/2021 43  >39 mg/dL Final     LDL Cholesterol Calculated 04/08/2021 109* <100 mg/dL Final    Comment: Above desirable:  100-129 mg/dl  Borderline High:  130-159 mg/dL  High:             160-189 mg/dL  Very high:       >189 mg/dl       Non HDL Cholesterol 04/08/2021 128  <130 mg/dL Final     Creatinine Urine 04/08/2021 116  mg/dL Final     Albumin Urine mg/L 04/08/2021 16  mg/L Final     Albumin Urine mg/g Cr 04/08/2021 13.71  0 - 17 mg/g Cr Final               "

## 2021-05-27 ENCOUNTER — MYC MEDICAL ADVICE (OUTPATIENT)
Dept: FAMILY MEDICINE | Facility: CLINIC | Age: 51
End: 2021-05-27

## 2021-05-28 NOTE — TELEPHONE ENCOUNTER
Dr. Huffman,    See patients my chart message. Also, left him a message to call back. When we talk to patient was going to schedule him for virtual on Tuesday morning. Any adjustments you want to do with his medications before then?    Thanks  Anuradha Funes RN   [facility]– [place]

## 2021-05-28 NOTE — TELEPHONE ENCOUNTER
Pt called back - relayed Dr. Huffman's message to him. Pt verbalized understanding, will stop taking glipizide. Scheduled pt for f/u virtual visit with Dr. Huffman on Tues.    Latonia Wright RN  [facility]

## 2021-06-01 ENCOUNTER — VIRTUAL VISIT (OUTPATIENT)
Dept: FAMILY MEDICINE | Facility: CLINIC | Age: 51
End: 2021-06-01
Payer: COMMERCIAL

## 2021-06-01 DIAGNOSIS — E11.65 TYPE 2 DIABETES MELLITUS WITH HYPERGLYCEMIA, WITHOUT LONG-TERM CURRENT USE OF INSULIN (H): ICD-10-CM

## 2021-06-01 PROCEDURE — 99213 OFFICE O/P EST LOW 20 MIN: CPT | Mod: 95 | Performed by: FAMILY MEDICINE

## 2021-06-01 NOTE — PROGRESS NOTES
Morro is a 50 year old who is being evaluated via a billable telephone visit.      What phone number would you like to be contacted at? [phone]  How would you like to obtain your AVS? MyChart    Assessment & Plan     Type 2 diabetes mellitus with hyperglycemia, without long-term current use of insulin (H)  Patient had experienced some hypoglycemia and this has reportedly resolved since discontinuing the glipizide.  The Metformin is scored and we could consider cutting back on that if he continues to have low blood sugars but none recently.  Refill of the Metformin was sent to [place] as that is his preferred pharmacy and he is almost out.  A follow-up visit was scheduled for July 12.  He was urged to contact me in the interim if he has any more low blood sugars.  - metFORMIN (GLUCOPHAGE) 1000 MG tablet; Take 1 tablet (1,000 mg) by mouth 2 times daily (with meals)                 Return in about 6 weeks (around 7/12/2021) for Diabetes Check.    Nehemias Huffman MD  [facility]    Subjective   Morro is a 50 year old who presents for the following health issues     HPI     Diabetes Follow-up    How often are you checking your blood sugar? One time daily  What time of day are you checking your blood sugars (select all that apply)?  In the morning   Have you had any blood sugars above 200?  No  Have you had any blood sugars below 70?  No    What symptoms do you notice when your blood sugar is low?  Weak and light headed    What concerns do you have today about your diabetes? None     Do you have any of these symptoms? (Select all that apply)  No numbness or tingling in feet.  No redness, sores or blisters on feet.  No complaints of excessive thirst.  No reports of blurry vision.  No significant changes to weight.      BP Readings from Last 2 Encounters:   05/06/21 126/82   04/08/21 120/82     Hemoglobin A1C (%)   Date Value   04/03/2021 >15.0 (H)     LDL Cholesterol  Calculated (mg/dL)   Date Value   04/08/2021 109 (H)                 How many servings of fruits and vegetables do you eat daily?  0-1    On average, how many sweetened beverages do you drink each day (Examples: soda, juice, sweet tea, etc.  Do NOT count diet or artificially sweetened beverages)?   1    How many days per week do you exercise enough to make your heart beat faster? 3 or less    How many minutes a day do you exercise enough to make your heart beat faster? 9 or less    How many days per week do you miss taking your medication? 0    Blood sugars were as low as the mid 40s.  He felt weak and dizzy but did not lose consciousness.  He ate something sweet and symptoms resolved within a few minutes.  He continues taking the Metformin.  He is discontinued the glipizide and has not had any low blood sugar since doing that.    Review of Systems   Constitutional, HEENT, cardiovascular, pulmonary, gi and gu systems are negative, except as otherwise noted.      Objective           Vitals:  No vitals were obtained today due to virtual visit.    Physical Exam   healthy, alert and no distress  PSYCH: Alert and oriented times 3; coherent speech, normal   rate and volume, able to articulate logical thoughts, able   to abstract reason, no tangential thoughts, no hallucinations   or delusions  His affect is normal  RESP: No cough, no audible wheezing, able to talk in full sentences  Remainder of exam unable to be completed due to telephone visits                Phone call duration: 8 minutes

## 2021-06-10 ENCOUNTER — VIRTUAL VISIT (OUTPATIENT)
Dept: EDUCATION SERVICES | Facility: CLINIC | Age: 51
End: 2021-06-10
Payer: COMMERCIAL

## 2021-06-10 DIAGNOSIS — E11.65 TYPE 2 DIABETES MELLITUS WITH HYPERGLYCEMIA, WITHOUT LONG-TERM CURRENT USE OF INSULIN (H): Primary | ICD-10-CM

## 2021-06-10 PROCEDURE — 98967 PH1 ASSMT&MGMT NQHP 11-20: CPT

## 2021-06-10 NOTE — PATIENT INSTRUCTIONS
My Diabetes Care Goals:    Recheck A1C next month, goal <7%  Get your eyes checked some time this summer  Keep up the hard work with diet & exercise    Follow up:  Follow-up for annual diabetes education review in 1 year or sooner, if needed.     Bring blood glucose meter and logbook with you to all doctor and follow-up appointments.    [facility]:  For Your Diabetes Education and Nutrition Appointments Call:  [phone]   For Diabetes Education or Nutrition Related Questions:   Phone: [phone]  Send [employer] Message   If you need a medication refill please contact your pharmacy. Please allow 3 business days for your refills to be completed.

## 2021-07-12 ENCOUNTER — LAB (OUTPATIENT)
Dept: LAB | Facility: CLINIC | Age: 51
End: 2021-07-12

## 2021-07-12 ENCOUNTER — OFFICE VISIT (OUTPATIENT)
Dept: FAMILY MEDICINE | Facility: CLINIC | Age: 51
End: 2021-07-12
Payer: COMMERCIAL

## 2021-07-12 VITALS
HEIGHT: 66 IN | HEART RATE: 95 BPM | TEMPERATURE: 97.8 F | DIASTOLIC BLOOD PRESSURE: 72 MMHG | BODY MASS INDEX: 27.32 KG/M2 | OXYGEN SATURATION: 95 % | SYSTOLIC BLOOD PRESSURE: 118 MMHG | WEIGHT: 170 LBS

## 2021-07-12 DIAGNOSIS — Z11.59 NEED FOR HEPATITIS C SCREENING TEST: ICD-10-CM

## 2021-07-12 DIAGNOSIS — E11.65 TYPE 2 DIABETES MELLITUS WITH HYPERGLYCEMIA, WITHOUT LONG-TERM CURRENT USE OF INSULIN (H): ICD-10-CM

## 2021-07-12 DIAGNOSIS — Z11.4 ENCOUNTER FOR SCREENING FOR HIV: ICD-10-CM

## 2021-07-12 DIAGNOSIS — E11.65 TYPE 2 DIABETES MELLITUS WITH HYPERGLYCEMIA, WITHOUT LONG-TERM CURRENT USE OF INSULIN (H): Primary | ICD-10-CM

## 2021-07-12 LAB — HBA1C MFR BLD: 7 % (ref 0–5.6)

## 2021-07-12 PROCEDURE — 99214 OFFICE O/P EST MOD 30 MIN: CPT | Performed by: FAMILY MEDICINE

## 2021-07-12 PROCEDURE — 80048 BASIC METABOLIC PNL TOTAL CA: CPT

## 2021-07-12 PROCEDURE — 83036 HEMOGLOBIN GLYCOSYLATED A1C: CPT

## 2021-07-12 PROCEDURE — 86803 HEPATITIS C AB TEST: CPT

## 2021-07-12 PROCEDURE — 36415 COLL VENOUS BLD VENIPUNCTURE: CPT

## 2021-07-12 PROCEDURE — 87389 HIV-1 AG W/HIV-1&-2 AB AG IA: CPT

## 2021-07-12 ASSESSMENT — MIFFLIN-ST. JEOR: SCORE: 1573.86

## 2021-07-12 NOTE — PROGRESS NOTES
Assessment & Plan     Type 2 diabetes mellitus with hyperglycemia, without long-term current use of insulin (H)  Blood sugars checked at home would suggest that his diabetes control has improved significantly.  Its been 3 months since the last A1c is checked so we will go ahead and do that again today along with a basic metabolic panel.  Anticipate follow-up in about 3 months.  We briefly touched about statin use at this time and will revisit that in approximately 3 months.  - Hemoglobin A1c; Future  - Basic metabolic panel  (Ca, Cl, CO2, Creat, Gluc, K, Na, BUN); Future    Need for hepatitis C screening test  One-time screening test for hepatitis C was discussed and done today.  - Hepatitis C antibody; Future    Encounter for screening for HIV  One-time screening test for HIV was discussed and done today.  - HIV Antigen Antibody Combo; Future                 Return in about 3 months (around 10/12/2021) for in person, Diabetes Check.    Nehemias Huffman MD  [facility]    Camilo Hooker is a 50 year old who presents for the following health issues     HPI     Diabetes Follow-up    How often are you checking your blood sugar? One time daily  What time of day are you checking your blood sugars (select all that apply)?  After meals  Have you had any blood sugars above 200?  Yes, this morning after breakfast  Have you had any blood sugars below 70?  No    What symptoms do you notice when your blood sugar is low?  None    What concerns do you have today about your diabetes? None     Do you have any of these symptoms? (Select all that apply)  No numbness or tingling in feet.  No redness, sores or blisters on feet.  No complaints of excessive thirst.  No reports of blurry vision.  No significant changes to weight.      BP Readings from Last 2 Encounters:   07/12/21 118/72   05/06/21 126/82     Hemoglobin A1C (%)   Date Value   04/03/2021 >15.0 (H)     LDL Cholesterol Calculated  "(mg/dL)   Date Value   04/08/2021 109 (H)                 How many servings of fruits and vegetables do you eat daily?  2-3    On average, how many sweetened beverages do you drink each day (Examples: soda, juice, sweet tea, etc.  Do NOT count diet or artificially sweetened beverages)?   1    How many days per week do you exercise enough to make your heart beat faster? 4    How many minutes a day do you exercise enough to make your heart beat faster? 30 - 60    How many days per week do you miss taking your medication? 0    Patient reports he is doing well at this time and has no concerns.  Blood sugars are running as noted above with no more low sugars once stopping the glipizide.    Review of Systems   Constitutional, HEENT, cardiovascular, pulmonary, gi and gu systems are negative, except as otherwise noted.      Objective    /72   Pulse 95   Temp 97.8  F (36.6  C) (Temporal)   Ht 1.676 m (5' 6\")   Wt 77.1 kg (170 lb)   SpO2 95%   BMI 27.44 kg/m    Body mass index is 27.44 kg/m .  Physical Exam   GENERAL: healthy, alert and no distress  EYES: Eyes grossly normal to inspection, PERRL and conjunctivae and sclerae normal  NECK: no adenopathy, no asymmetry, masses, or scars and thyroid normal to palpation  RESP: lungs clear to auscultation - no rales, rhonchi or wheezes  CV: regular rate and rhythm, normal S1 S2, no S3 or S4, no murmur, click or rub, no peripheral edema and peripheral pulses strong  MS: no gross musculoskeletal defects noted, no edema  NEURO: Normal strength and tone, mentation intact and speech normal  PSYCH: mentation appears normal, affect normal/bright                "

## 2021-07-13 LAB
ANION GAP SERPL CALCULATED.3IONS-SCNC: 6 MMOL/L (ref 3–14)
BUN SERPL-MCNC: 12 MG/DL (ref 7–30)
CALCIUM SERPL-MCNC: 9.5 MG/DL (ref 8.5–10.1)
CHLORIDE BLD-SCNC: 105 MMOL/L
CO2 SERPL-SCNC: 28 MMOL/L (ref 20–32)
CREAT SERPL-MCNC: 0.95 MG/DL
GFR SERPL CREATININE-BSD FRML MDRD: >90 ML/MIN/1.73M2
GLUCOSE BLD-MCNC: 146 MG/DL (ref 70–99)
HCV AB SERPL QL IA: NONREACTIVE
HIV 1+2 AB+HIV1 P24 AG SERPL QL IA: NONREACTIVE
POTASSIUM BLD-SCNC: 4.4 MMOL/L (ref 3.4–5.3)
SODIUM SERPL-SCNC: 139 MMOL/L (ref 133–144)

## 2021-08-02 DIAGNOSIS — E11.65 TYPE 2 DIABETES MELLITUS WITH HYPERGLYCEMIA, WITHOUT LONG-TERM CURRENT USE OF INSULIN (H): ICD-10-CM

## 2021-08-03 RX ORDER — LANCETS
EACH MISCELLANEOUS
Qty: 100 EACH | Refills: 6 | Status: SHIPPED | OUTPATIENT
Start: 2021-08-03

## 2021-08-03 RX ORDER — BLOOD SUGAR DIAGNOSTIC
STRIP MISCELLANEOUS
Qty: 100 STRIP | Refills: 11 | Status: SHIPPED | OUTPATIENT
Start: 2021-08-03 | End: 2022-08-16

## 2021-10-23 ENCOUNTER — HEALTH MAINTENANCE LETTER (OUTPATIENT)
Age: 51
End: 2021-10-23

## 2022-01-01 ENCOUNTER — TRANSFERRED RECORDS (OUTPATIENT)
Dept: MULTI SPECIALTY CLINIC | Facility: CLINIC | Age: 52
End: 2022-01-01

## 2022-01-01 LAB — RETINOPATHY: NORMAL

## 2022-01-24 ENCOUNTER — OFFICE VISIT (OUTPATIENT)
Dept: FAMILY MEDICINE | Facility: CLINIC | Age: 52
End: 2022-01-24
Payer: COMMERCIAL

## 2022-01-24 VITALS
HEART RATE: 80 BPM | OXYGEN SATURATION: 97 % | BODY MASS INDEX: 27.31 KG/M2 | WEIGHT: 174 LBS | SYSTOLIC BLOOD PRESSURE: 129 MMHG | HEIGHT: 67 IN | TEMPERATURE: 97 F | DIASTOLIC BLOOD PRESSURE: 81 MMHG

## 2022-01-24 DIAGNOSIS — Z12.5 SCREENING PSA (PROSTATE SPECIFIC ANTIGEN): ICD-10-CM

## 2022-01-24 DIAGNOSIS — Z12.11 SPECIAL SCREENING FOR MALIGNANT NEOPLASMS, COLON: ICD-10-CM

## 2022-01-24 DIAGNOSIS — E11.65 TYPE 2 DIABETES MELLITUS WITH HYPERGLYCEMIA, WITHOUT LONG-TERM CURRENT USE OF INSULIN (H): Primary | ICD-10-CM

## 2022-01-24 LAB — HBA1C MFR BLD: 7.6 % (ref 0–5.6)

## 2022-01-24 PROCEDURE — G0103 PSA SCREENING: HCPCS | Performed by: FAMILY MEDICINE

## 2022-01-24 PROCEDURE — 99213 OFFICE O/P EST LOW 20 MIN: CPT | Performed by: FAMILY MEDICINE

## 2022-01-24 PROCEDURE — 83036 HEMOGLOBIN GLYCOSYLATED A1C: CPT | Performed by: FAMILY MEDICINE

## 2022-01-24 PROCEDURE — 80048 BASIC METABOLIC PNL TOTAL CA: CPT | Performed by: FAMILY MEDICINE

## 2022-01-24 PROCEDURE — 36415 COLL VENOUS BLD VENIPUNCTURE: CPT | Performed by: FAMILY MEDICINE

## 2022-01-24 ASSESSMENT — MIFFLIN-ST. JEOR: SCORE: 1602.89

## 2022-01-24 NOTE — PROGRESS NOTES
"  Assessment & Plan     Type 2 diabetes mellitus with hyperglycemia, without long-term current use of insulin (H)  We will check laboratory studies as noted.  Metformin was refilled today.  If control remains good anticipate follow-up in about 6 months.  - Hemoglobin A1c; Future  - Basic metabolic panel  (Ca, Cl, CO2, Creat, Gluc, K, Na, BUN); Future  - metFORMIN (GLUCOPHAGE) 1000 MG tablet; Take 1 tablet (1,000 mg) by mouth 2 times daily (with meals)  - Hemoglobin A1c  - Basic metabolic panel  (Ca, Cl, CO2, Creat, Gluc, K, Na, BUN)    Screening PSA (prostate specific antigen)  Screening for prostate cancer was discussed and done today.  - PSA, screen; Future  - PSA, screen    Special screening for malignant neoplasms, colon  Screening for colon cancer options were reviewed today.  FIT card will be dispensed at patient request but I did issue an order for a colonoscopy if he were to change his mind and decide to pursue that.  - Fecal colorectal cancer screen (FIT); Future  - Adult Gastro Ref - Procedure Only; Future             BMI:   Estimated body mass index is 27.25 kg/m  as calculated from the following:    Height as of this encounter: 1.702 m (5' 7\").    Weight as of this encounter: 78.9 kg (174 lb).           Return in about 6 months (around 7/24/2022) for Physical Exam.    Nehemias Huffman MD  [facility]    Camilo Hooker is a 51 year old who presents for the following health issues  accompanied by his spouse.    HPI     Diabetes Follow-up    How often are you checking your blood sugar? One time daily  What time of day are you checking your blood sugars (select all that apply)?  Before meals  Have you had any blood sugars above 200?  No  Have you had any blood sugars below 70?  No    What symptoms do you notice when your blood sugar is low?  None    What concerns do you have today about your diabetes? None     Do you have any of these symptoms? (Select all that apply)  No " "numbness or tingling in feet.  No redness, sores or blisters on feet.  No complaints of excessive thirst.  No reports of blurry vision.  No significant changes to weight.    Have you had a diabetic eye exam in the last 12 months? No        BP Readings from Last 2 Encounters:   01/24/22 129/81   07/12/21 118/72     Hemoglobin A1C POCT (%)   Date Value   04/03/2021 >15.0 (H)     Hemoglobin A1C (%)   Date Value   07/12/2021 7.0 (H)     LDL Cholesterol Calculated (mg/dL)   Date Value   04/08/2021 109 (H)                 How many servings of fruits and vegetables do you eat daily?  2-3    On average, how many sweetened beverages do you drink each day (Examples: soda, juice, sweet tea, etc.  Do NOT count diet or artificially sweetened beverages)?   2    How many days per week do you exercise enough to make your heart beat faster? 3 or less    How many minutes a day do you exercise enough to make your heart beat faster? 10 - 19    How many days per week do you miss taking your medication? 0    Patient is here with his wife today for follow-up.  Blood sugars are typically running 115-125 and he checks them when he gets home from work before he has something to eat and goes to bed.  He has not had an eye examination recently.  His wife asked about colon cancer screening options today.    Review of Systems   Constitutional, HEENT, cardiovascular, pulmonary, gi and gu systems are negative, except as otherwise noted.      Objective    /81 (BP Location: Right arm, Patient Position: Sitting, Cuff Size: Adult Regular)   Pulse 80   Temp 97  F (36.1  C) (Temporal)   Ht 1.702 m (5' 7\")   Wt 78.9 kg (174 lb)   SpO2 97%   BMI 27.25 kg/m    Body mass index is 27.25 kg/m .  Physical Exam   GENERAL: healthy, alert and no distress  EYES: Eyes grossly normal to inspection, PERRL and conjunctivae and sclerae normal  HENT: ear canals and TM's normal, nose and mouth without ulcers or lesions  NECK: no adenopathy, no asymmetry, " masses, or scars and thyroid normal to palpation  RESP: lungs clear to auscultation - no rales, rhonchi or wheezes  CV: regular rate and rhythm, normal S1 S2, no S3 or S4, no murmur, click or rub, no peripheral edema and peripheral pulses strong  MS: no gross musculoskeletal defects noted, no edema  NEURO: Normal strength and tone, mentation intact and speech normal  PSYCH: mentation appears normal, affect normal/bright

## 2022-01-25 LAB
ANION GAP SERPL CALCULATED.3IONS-SCNC: 4 MMOL/L (ref 3–14)
BUN SERPL-MCNC: 16 MG/DL (ref 7–30)
CALCIUM SERPL-MCNC: 9.6 MG/DL (ref 8.5–10.1)
CHLORIDE BLD-SCNC: 105 MMOL/L (ref 94–109)
CO2 SERPL-SCNC: 30 MMOL/L (ref 20–32)
CREAT SERPL-MCNC: 0.88 MG/DL (ref 0.66–1.25)
GFR SERPL CREATININE-BSD FRML MDRD: >90 ML/MIN/1.73M2
GLUCOSE BLD-MCNC: 134 MG/DL (ref 70–99)
POTASSIUM BLD-SCNC: 4.4 MMOL/L (ref 3.4–5.3)
PSA SERPL-MCNC: 0.63 UG/L (ref 0–4)
SODIUM SERPL-SCNC: 139 MMOL/L (ref 133–144)

## 2022-02-06 ENCOUNTER — LAB (OUTPATIENT)
Dept: LAB | Facility: CLINIC | Age: 52
End: 2022-02-06
Payer: COMMERCIAL

## 2022-02-06 DIAGNOSIS — Z12.11 SPECIAL SCREENING FOR MALIGNANT NEOPLASMS, COLON: ICD-10-CM

## 2022-02-06 PROCEDURE — 82274 ASSAY TEST FOR BLOOD FECAL: CPT

## 2022-02-08 LAB — HEMOCCULT STL QL IA: NEGATIVE

## 2022-06-04 ENCOUNTER — HEALTH MAINTENANCE LETTER (OUTPATIENT)
Age: 52
End: 2022-06-04

## 2022-06-07 ENCOUNTER — TELEPHONE (OUTPATIENT)
Dept: FAMILY MEDICINE | Facility: CLINIC | Age: 52
End: 2022-06-07
Payer: COMMERCIAL

## 2022-06-07 ENCOUNTER — OFFICE VISIT (OUTPATIENT)
Dept: FAMILY MEDICINE | Facility: CLINIC | Age: 52
End: 2022-06-07
Payer: COMMERCIAL

## 2022-06-07 VITALS
HEIGHT: 67 IN | SYSTOLIC BLOOD PRESSURE: 110 MMHG | OXYGEN SATURATION: 95 % | BODY MASS INDEX: 28.96 KG/M2 | HEART RATE: 73 BPM | WEIGHT: 184.5 LBS | TEMPERATURE: 98.3 F | DIASTOLIC BLOOD PRESSURE: 76 MMHG

## 2022-06-07 DIAGNOSIS — M54.50 ACUTE MIDLINE LOW BACK PAIN WITHOUT SCIATICA: Primary | ICD-10-CM

## 2022-06-07 DIAGNOSIS — E11.65 TYPE 2 DIABETES MELLITUS WITH HYPERGLYCEMIA, WITHOUT LONG-TERM CURRENT USE OF INSULIN (H): ICD-10-CM

## 2022-06-07 LAB — HBA1C MFR BLD: 8 % (ref 0–5.6)

## 2022-06-07 PROCEDURE — 36415 COLL VENOUS BLD VENIPUNCTURE: CPT | Performed by: FAMILY MEDICINE

## 2022-06-07 PROCEDURE — 80048 BASIC METABOLIC PNL TOTAL CA: CPT | Performed by: FAMILY MEDICINE

## 2022-06-07 PROCEDURE — 82043 UR ALBUMIN QUANTITATIVE: CPT | Performed by: FAMILY MEDICINE

## 2022-06-07 PROCEDURE — 99214 OFFICE O/P EST MOD 30 MIN: CPT | Performed by: FAMILY MEDICINE

## 2022-06-07 PROCEDURE — 80061 LIPID PANEL: CPT | Performed by: FAMILY MEDICINE

## 2022-06-07 PROCEDURE — 83036 HEMOGLOBIN GLYCOSYLATED A1C: CPT | Performed by: FAMILY MEDICINE

## 2022-06-07 RX ORDER — DICLOFENAC SODIUM 75 MG/1
75 TABLET, DELAYED RELEASE ORAL 2 TIMES DAILY PRN
Qty: 30 TABLET | Refills: 0 | Status: SHIPPED | OUTPATIENT
Start: 2022-06-07 | End: 2023-02-20

## 2022-06-07 RX ORDER — CYCLOBENZAPRINE HCL 10 MG
10 TABLET ORAL 3 TIMES DAILY PRN
Qty: 30 TABLET | Refills: 0 | Status: SHIPPED | OUTPATIENT
Start: 2022-06-07 | End: 2023-02-20

## 2022-06-07 ASSESSMENT — ENCOUNTER SYMPTOMS: BACK PAIN: 1

## 2022-06-07 NOTE — TELEPHONE ENCOUNTER
I do not have a meeting from 1-2 today, so if it works to come during that time, I can see him today.  Otherwise urgent care or an ortho walk in clinic are good options for him.    Nehemias Huffman MD

## 2022-06-07 NOTE — TELEPHONE ENCOUNTER
Dr. Huffman,  Please see below.  Did you want to add pt in today?   otherwise can advise UC.  Please advise.  Thanks,  Patria Manley RN

## 2022-06-07 NOTE — PROGRESS NOTES
"  Assessment & Plan     Acute midline low back pain without sciatica  The back pain is most consistent with a musculoskeletal cause and the exam today and history did not have any red flags.  I suggested Voltaren and Flexeril with side effects reviewed with the patient.  Indications for follow-up were also discussed.  He could do some gentle stretching exercises at home.  - diclofenac (VOLTAREN) 75 MG EC tablet; Take 1 tablet (75 mg) by mouth 2 times daily as needed for moderate pain  - cyclobenzaprine (FLEXERIL) 10 MG tablet; Take 1 tablet (10 mg) by mouth 3 times daily as needed for muscle spasms    Type 2 diabetes mellitus with hyperglycemia, without long-term current use of insulin (H)  We will update on diabetes at this time.  Suspect control should be good because he does not have any the symptoms that brought him in initially.  If control is stable anticipate follow-up in 6 months, sooner if needed.  - REVIEW OF HEALTH MAINTENANCE PROTOCOL ORDERS  - Lipid panel reflex to direct LDL Non-fasting; Future  - Albumin Random Urine Quantitative with Creat Ratio; Future  - HEMOGLOBIN A1C; Future  - Basic metabolic panel  (Ca, Cl, CO2, Creat, Gluc, K, Na, BUN); Future  - Lipid panel reflex to direct LDL Non-fasting  - HEMOGLOBIN A1C  - Basic metabolic panel  (Ca, Cl, CO2, Creat, Gluc, K, Na, BUN)             BMI:   Estimated body mass index is 28.9 kg/m  as calculated from the following:    Height as of this encounter: 1.702 m (5' 7\").    Weight as of this encounter: 83.7 kg (184 lb 8 oz).           Return in about 6 months (around 12/7/2022) for Physical Exam.    Nehemias Huffman MD  [facility]    Camilo Hooker is a 51 year old who presents for the following health issues  accompanied by his spouse.    Back Pain     History of Present Illness       Back Pain:  He presents for follow up of back pain. Patient's back pain is a new problem.    Original cause of back pain: not " sure  First noticed back pain: in the last week  Patient feels back pain: constantlyLocation of back pain:  Right lower back and left lower back  Description of back pain: sharp and shooting  Back pain spreads: nowhere    Since patient first noticed back pain, pain is: gradually worsening  Does back pain interfere with his job:  Yes  On a scale of 1-10 (10 being the worst), patient describes pain as:  7  What makes back pain worse: bending, coughing, certain positions, lying down, sitting, standing and twisting  Acupuncture: not tried  Acetaminophen: not helpful  Activity or exercise: not tried  Chiropractor:  Not tried  Cold: not helpful  Heat: helpful  Massage: not helpful  Muscle relaxants: not tried  NSAIDS: not tried  Opioids: not tried  Physical Therapy: not tried  Rest: helpful  Steroid Injection: not tried  Stretching: helpful  Surgery: not tried  TENS unit: not tried  Topical pain relievers: not tried  Other healthcare providers patient is seeing for back pain: None    He eats 0-1 servings of fruits and vegetables daily.He consumes 1 sweetened beverage(s) daily.He exercises with enough effort to increase his heart rate 20 to 29 minutes per day.  He exercises with enough effort to increase his heart rate 5 days per week.   He is taking medications regularly.       Diabetes Follow-up    How often are you checking your blood sugar? A few times a week  What time of day are you checking your blood sugars (select all that apply)?  Before meals  Have you had any blood sugars above 200?  No  Have you had any blood sugars below 70?  No    What symptoms do you notice when your blood sugar is low?  None    What concerns do you have today about your diabetes? None     Do you have any of these symptoms? (Select all that apply)  No numbness or tingling in feet.  No redness, sores or blisters on feet.  No complaints of excessive thirst.  No reports of blurry vision.  No significant changes to weight.    Have you had a  "diabetic eye exam in the last 12 months? Yes- Date of last eye exam: did not ask,  Location: did not ask    No particular injury with the back.  He was up late on Friday night last week slept all night and experience the pain when he woke up in the morning.  Its central and located in the low back without radiation.  Tylenol is not helpful.  No bowel or bladder incontinence.  No leg symptoms.  He tried an inversion board at home did not find that helpful.  Relieved with sitting or lying down.  Aggravated with activity and lifting and bending.    BP Readings from Last 2 Encounters:   06/07/22 110/76   01/24/22 129/81     Hemoglobin A1C POCT (%)   Date Value   04/03/2021 >15.0 (H)     Hemoglobin A1C (%)   Date Value   01/24/2022 7.6 (H)   07/12/2021 7.0 (H)     LDL Cholesterol Calculated (mg/dL)   Date Value   04/08/2021 109 (H)                   Review of Systems   Musculoskeletal: Positive for back pain.      Constitutional, HEENT, cardiovascular, pulmonary, gi and gu systems are negative, except as otherwise noted.      Objective    /76   Pulse 73   Temp 98.3  F (36.8  C)   Ht 1.702 m (5' 7\")   Wt 83.7 kg (184 lb 8 oz)   SpO2 95%   BMI 28.90 kg/m    Body mass index is 28.9 kg/m .  Physical Exam   GENERAL: healthy, alert and no distress  EYES: Eyes grossly normal to inspection, PERRL and conjunctivae and sclerae normal  MS: no gross musculoskeletal defects noted, no edema  SKIN: no suspicious lesions or rashes  NEURO: Normal strength and tone, mentation intact and speech normal  Comprehensive back pain exam:  No tenderness, Pain limits the following motions: Extension, Lower extremity strength functional and equal on both sides, Lower extremity reflexes within normal limits bilaterally and Straight leg raise negative bilaterally  PSYCH: mentation appears normal, affect normal/bright  Diabetic foot exam: normal DP and PT pulses, no trophic changes or ulcerative lesions and normal sensory exam            "

## 2022-06-07 NOTE — TELEPHONE ENCOUNTER
General Call:     Who is calling:  Morro Newman    Reason for Call:  Asking for same day appointment    What are your questions or concerns:  Patient calling to schedule an appointment for pain. Pain is located in lower back right above the hips in the middle. Patient feels like he has a pinched nerve. Pain started on Saturday. Woke up with pain in back. Was up a lot the night before with wife when she had a baby. Took 2 500mg tabs of Tylenol when he went to work last night at 10 pm. Had to leave work at 2 am. Only thing that helps is to lay down. Has tried a heating pad. He would like to schedule an appointment for today.    Date of last appointment with provider: 1/24/22    Okay to leave a detailed message:[facility] at Home number on file [phone] (home)     Please call to follow up.    Nereida Rios RN

## 2022-06-08 LAB
ANION GAP SERPL CALCULATED.3IONS-SCNC: 1 MMOL/L (ref 3–14)
BUN SERPL-MCNC: 16 MG/DL (ref 7–30)
CALCIUM SERPL-MCNC: 9.2 MG/DL (ref 8.5–10.1)
CHLORIDE BLD-SCNC: 106 MMOL/L (ref 94–109)
CHOLEST SERPL-MCNC: 146 MG/DL
CO2 SERPL-SCNC: 31 MMOL/L (ref 20–32)
CREAT SERPL-MCNC: 0.98 MG/DL (ref 0.66–1.25)
CREAT UR-MCNC: 80 MG/DL
FASTING STATUS PATIENT QL REPORTED: NO
GFR SERPL CREATININE-BSD FRML MDRD: >90 ML/MIN/1.73M2
GLUCOSE BLD-MCNC: 156 MG/DL (ref 70–99)
HDLC SERPL-MCNC: 35 MG/DL
LDLC SERPL CALC-MCNC: 79 MG/DL
MICROALBUMIN UR-MCNC: 5 MG/L
MICROALBUMIN/CREAT UR: 6.25 MG/G CR (ref 0–17)
NONHDLC SERPL-MCNC: 111 MG/DL
POTASSIUM BLD-SCNC: 4.2 MMOL/L (ref 3.4–5.3)
SODIUM SERPL-SCNC: 138 MMOL/L (ref 133–144)
TRIGL SERPL-MCNC: 159 MG/DL

## 2022-08-16 DIAGNOSIS — E11.65 TYPE 2 DIABETES MELLITUS WITH HYPERGLYCEMIA, WITHOUT LONG-TERM CURRENT USE OF INSULIN (H): ICD-10-CM

## 2022-08-16 RX ORDER — BLOOD SUGAR DIAGNOSTIC
STRIP MISCELLANEOUS
Qty: 100 STRIP | Refills: 11 | Status: SHIPPED | OUTPATIENT
Start: 2022-08-16 | End: 2024-06-19

## 2022-08-16 NOTE — TELEPHONE ENCOUNTER
"6-7-22  Type 2 diabetes mellitus with hyperglycemia, without long-term current use of insulin (H)  We will update on diabetes at this time.  Suspect control should be good because he does not have any the symptoms that brought him in initially.  If control is stable anticipate follow-up in 6 months, sooner if needed.  - REVIEW OF HEALTH MAINTENANCE PROTOCOL ORDERS  - Lipid panel reflex to direct LDL Non-fasting; Future  - Albumin Random Urine Quantitative with Creat Ratio; Future  - HEMOGLOBIN A1C; Future  - Basic metabolic panel  (Ca, Cl, CO2, Creat, Gluc, K, Na, BUN); Future  - Lipid panel reflex to direct LDL Non-fasting  - HEMOGLOBIN A1C  - Basic metabolic panel  (Ca, Cl, CO2, Creat, Gluc, K, Na, BUN)     Requested Prescriptions   Pending Prescriptions Disp Refills     blood glucose (ACCU-CHEK GUIDE) test strip 100 strip 11     Sig: Use to test blood sugar 2 times daily or as directed.       Diabetic Supplies Protocol Passed - 8/16/2022  3:14 PM        Passed - Medication is active on med list        Passed - Patient is 18 years of age or older        Passed - Recent (6 mo) or future (30 days) visit within the authorizing provider's specialty     Patient had office visit in the last 6 months or has a visit in the next 30 days with authorizing provider.  See \"Patient Info\" tab in inbasket, or \"Choose Columns\" in Meds & Orders section of the refill encounter.               STATIN NOT PRESCRIBED (INTENTIONAL)       Sig: Please choose reason not prescribed from choices below.       Statins Protocol Failed - 8/16/2022  3:14 PM        Failed - Medication is active on med list        Passed - LDL on file in past 12 months     Recent Labs   Lab Test 06/07/22  1536   LDL 79             Passed - No abnormal creatine kinase in past 12 months     No lab results found.             Passed - Recent (12 mo) or future (30 days) visit within the authorizing provider's specialty     Patient has had an office visit with the " "authorizing provider or a provider within the authorizing providers department within the previous 12 mos or has a future within next 30 days. See \"Patient Info\" tab in inbasket, or \"Choose Columns\" in Meds & Orders section of the refill encounter.              Passed - Patient is age 18 or older           Maxine JEFFREY RN    "

## 2022-10-10 ENCOUNTER — HEALTH MAINTENANCE LETTER (OUTPATIENT)
Age: 52
End: 2022-10-10

## 2023-01-26 DIAGNOSIS — E11.65 TYPE 2 DIABETES MELLITUS WITH HYPERGLYCEMIA, WITHOUT LONG-TERM CURRENT USE OF INSULIN (H): ICD-10-CM

## 2023-01-26 NOTE — TELEPHONE ENCOUNTER
"Requested Prescriptions   Pending Prescriptions Disp Refills     metFORMIN (GLUCOPHAGE) 1000 MG tablet [Pharmacy Med Name: METFORMIN HCL 1000MG TABS] 180 tablet 3     Sig: TAKE ONE TABLET BY MOUTH TWICE A DAY WITH MEALS       Biguanide Agents Failed - 1/26/2023  1:37 AM        Failed - Patient has documented A1c within the specified period of time.     If HgbA1C is 8 or greater, it needs to be on file within the past 3 months.  If less than 8, must be on file within the past 6 months.     Recent Labs   Lab Test 06/07/22  1536   A1C 8.0*             Passed - Patient is age 10 or older        Passed - Patient's CR is NOT>1.4 OR Patient's EGFR is NOT<45 within past 12 mos.     Recent Labs   Lab Test 06/07/22  1536 07/12/21  1007 04/03/21  1831   GFRESTIMATED >90   < > >90   GFRESTBLACK  --   --  >90    < > = values in this interval not displayed.       Recent Labs   Lab Test 06/07/22  1536   CR 0.98             Passed - Patient does NOT have a diagnosis of CHF.        Passed - Medication is active on med list        Passed - Recent (6 mo) or future (30 days) visit within the authorizing provider's specialty     Patient had office visit in the last 6 months or has a visit in the next 30 days with authorizing provider or within the authorizing provider's specialty.  See \"Patient Info\" tab in inbasket, or \"Choose Columns\" in Meds & Orders section of the refill encounter.             Routing refill request to provider for review/approval because:  Labs out of range:  A1C    Arturo Davis RN   [facility]            "

## 2023-02-01 ENCOUNTER — TRANSFERRED RECORDS (OUTPATIENT)
Dept: MULTI SPECIALTY CLINIC | Facility: CLINIC | Age: 53
End: 2023-02-01

## 2023-02-01 LAB — RETINOPATHY: NORMAL

## 2023-02-18 ENCOUNTER — HEALTH MAINTENANCE LETTER (OUTPATIENT)
Age: 53
End: 2023-02-18

## 2023-02-20 ENCOUNTER — APPOINTMENT (OUTPATIENT)
Dept: LAB | Facility: CLINIC | Age: 53
End: 2023-02-20
Payer: COMMERCIAL

## 2023-02-20 ENCOUNTER — OFFICE VISIT (OUTPATIENT)
Dept: FAMILY MEDICINE | Facility: CLINIC | Age: 53
End: 2023-02-20
Payer: COMMERCIAL

## 2023-02-20 VITALS
HEART RATE: 78 BPM | SYSTOLIC BLOOD PRESSURE: 110 MMHG | HEIGHT: 67 IN | BODY MASS INDEX: 28.33 KG/M2 | OXYGEN SATURATION: 98 % | DIASTOLIC BLOOD PRESSURE: 74 MMHG | TEMPERATURE: 98 F | RESPIRATION RATE: 18 BRPM | WEIGHT: 180.5 LBS

## 2023-02-20 DIAGNOSIS — E11.65 TYPE 2 DIABETES MELLITUS WITH HYPERGLYCEMIA, WITHOUT LONG-TERM CURRENT USE OF INSULIN (H): ICD-10-CM

## 2023-02-20 DIAGNOSIS — Z12.11 SCREEN FOR COLON CANCER: ICD-10-CM

## 2023-02-20 LAB
ALBUMIN SERPL BCG-MCNC: 4.4 G/DL (ref 3.5–5.2)
ALP SERPL-CCNC: 39 U/L (ref 40–129)
ALT SERPL W P-5'-P-CCNC: 24 U/L (ref 10–50)
ANION GAP SERPL CALCULATED.3IONS-SCNC: 10 MMOL/L (ref 7–15)
AST SERPL W P-5'-P-CCNC: 24 U/L (ref 10–50)
BILIRUB SERPL-MCNC: 0.4 MG/DL
BUN SERPL-MCNC: 14 MG/DL (ref 6–20)
CALCIUM SERPL-MCNC: 9.6 MG/DL (ref 8.6–10)
CHLORIDE SERPL-SCNC: 106 MMOL/L (ref 98–107)
CREAT SERPL-MCNC: 0.95 MG/DL (ref 0.67–1.17)
DEPRECATED HCO3 PLAS-SCNC: 29 MMOL/L (ref 22–29)
GFR SERPL CREATININE-BSD FRML MDRD: >90 ML/MIN/1.73M2
GLUCOSE SERPL-MCNC: 107 MG/DL (ref 70–99)
HBA1C MFR BLD: 6.9 % (ref 0–5.6)
POTASSIUM SERPL-SCNC: 4.2 MMOL/L (ref 3.4–5.3)
PROT SERPL-MCNC: 6.9 G/DL (ref 6.4–8.3)
SODIUM SERPL-SCNC: 145 MMOL/L (ref 136–145)

## 2023-02-20 PROCEDURE — 99214 OFFICE O/P EST MOD 30 MIN: CPT | Performed by: FAMILY MEDICINE

## 2023-02-20 PROCEDURE — 83036 HEMOGLOBIN GLYCOSYLATED A1C: CPT | Performed by: FAMILY MEDICINE

## 2023-02-20 PROCEDURE — 36415 COLL VENOUS BLD VENIPUNCTURE: CPT | Performed by: FAMILY MEDICINE

## 2023-02-20 PROCEDURE — 80053 COMPREHEN METABOLIC PANEL: CPT | Performed by: FAMILY MEDICINE

## 2023-02-20 ASSESSMENT — PAIN SCALES - GENERAL: PAINLEVEL: NO PAIN (0)

## 2023-02-20 NOTE — PROGRESS NOTES
"  Assessment & Plan     Type 2 diabetes mellitus with hyperglycemia, without long-term current use of insulin (H)  We will update labs today and refill his metformin for 1 year.  Depending on control of the A1c which was slightly elevated at the last visit we may need to make some modifications in the interim to his medication regimen.  Follow-up was scheduled in 4 months for routine physical and diabetes check.  - metFORMIN (GLUCOPHAGE) 1000 MG tablet; Take 1 tablet (1,000 mg) by mouth 2 times daily (with meals)  - HEMOGLOBIN A1C; Future  - Comprehensive metabolic panel (BMP + Alb, Alk Phos, ALT, AST, Total. Bili, TP); Future  - HEMOGLOBIN A1C  - Comprehensive metabolic panel (BMP + Alb, Alk Phos, ALT, AST, Total. Bili, TP)    Screen for colon cancer  He has done FIT testing in the past and is due for repeat at this time so that was issued today.  - Fecal colorectal cancer screen FIT - Future (S+30); Future  - Fecal colorectal cancer screen FIT - Future (S+30)             Nicotine/Tobacco Cessation:  He reports that he has been smoking cigarettes. He has a 12.50 pack-year smoking history. He has never used smokeless tobacco.  Nicotine/Tobacco Cessation Plan:         BMI:   Estimated body mass index is 28.5 kg/m  as calculated from the following:    Height as of this encounter: 1.695 m (5' 6.73\").    Weight as of this encounter: 81.9 kg (180 lb 8 oz).           Return in about 16 weeks (around 6/12/2023) for Physical Exam, Diabetes Check.    Nehemias Huffman MD  [facility]    Camilo Hooker is a 52 year old accompanied by his spouse, presenting for the following health issues:  Pre-Diabetic Check Up      History of Present Illness       Diabetes:   He presents for follow up of diabetes.  He is checking home blood glucose a few times a month. He checks blood glucose before meals.  Blood glucose is never over 200 and never under 70. When his blood glucose is low, the patient is " "asymptomatic for confusion, blurred vision, lethargy and reports not feeling dizzy, shaky, or weak.  He has no concerns regarding his diabetes at this time.  He is not experiencing numbness or burning in feet, excessive thirst, blurry vision, weight changes or redness, sores or blisters on feet. The patient has had a diabetic eye exam in the last 12 months. Eye exam performed on january 2022. [facility].        He eats 0-1 servings of fruits and vegetables daily.He consumes 1 sweetened beverage(s) daily.He exercises with enough effort to increase his heart rate 20 to 29 minutes per day.  He exercises with enough effort to increase his heart rate 7 days per week.   He is taking medications regularly.         Patient is here today for diabetes follow-up.  He is feeling well and does not really have any specific concerns today.  He thinks he has been out of the metformin for about a month.  No other concerns were brought up by the patient today.    Review of Systems   Constitutional, HEENT, cardiovascular, pulmonary, gi and gu systems are negative, except as otherwise noted.      Objective    /74 (BP Location: Left arm, Patient Position: Sitting, Cuff Size: Adult Regular)   Pulse 78   Temp 98  F (36.7  C) (Temporal)   Resp 18   Ht 1.695 m (5' 6.73\")   Wt 81.9 kg (180 lb 8 oz)   SpO2 98%   BMI 28.50 kg/m    Body mass index is 28.5 kg/m .  Physical Exam   GENERAL: healthy, alert and no distress  EYES: Eyes grossly normal to inspection, PERRL and conjunctivae and sclerae normal  NECK: no adenopathy, no asymmetry, masses, or scars and thyroid normal to palpation  RESP: lungs clear to auscultation - no rales, rhonchi or wheezes  CV: regular rate and rhythm, normal S1 S2, no S3 or S4, no murmur, click or rub, no peripheral edema and peripheral pulses strong  MS: no gross musculoskeletal defects noted, no edema  NEURO: Normal strength and tone, mentation intact and speech " normal  PSYCH: mentation appears normal, affect normal/bright    Results for orders placed or performed in visit on 02/20/23 (from the past 24 hour(s))   HEMOGLOBIN A1C   Result Value Ref Range    Hemoglobin A1C 6.9 (H) 0.0 - 5.6 %

## 2023-02-22 PROCEDURE — 82274 ASSAY TEST FOR BLOOD FECAL: CPT | Performed by: FAMILY MEDICINE

## 2023-02-24 LAB — HEMOCCULT STL QL IA: NEGATIVE

## 2023-05-27 ENCOUNTER — HEALTH MAINTENANCE LETTER (OUTPATIENT)
Age: 53
End: 2023-05-27

## 2023-06-10 ENCOUNTER — HEALTH MAINTENANCE LETTER (OUTPATIENT)
Age: 53
End: 2023-06-10

## 2023-06-12 ENCOUNTER — OFFICE VISIT (OUTPATIENT)
Dept: FAMILY MEDICINE | Facility: CLINIC | Age: 53
End: 2023-06-12
Payer: COMMERCIAL

## 2023-06-12 VITALS
TEMPERATURE: 97.4 F | SYSTOLIC BLOOD PRESSURE: 127 MMHG | BODY MASS INDEX: 27.86 KG/M2 | DIASTOLIC BLOOD PRESSURE: 86 MMHG | RESPIRATION RATE: 17 BRPM | WEIGHT: 177.5 LBS | HEIGHT: 67 IN | HEART RATE: 77 BPM | OXYGEN SATURATION: 97 %

## 2023-06-12 DIAGNOSIS — Z12.5 SCREENING FOR PROSTATE CANCER: ICD-10-CM

## 2023-06-12 DIAGNOSIS — Z00.00 ROUTINE GENERAL MEDICAL EXAMINATION AT A HEALTH CARE FACILITY: Primary | ICD-10-CM

## 2023-06-12 DIAGNOSIS — E11.65 TYPE 2 DIABETES MELLITUS WITH HYPERGLYCEMIA, WITHOUT LONG-TERM CURRENT USE OF INSULIN (H): ICD-10-CM

## 2023-06-12 LAB
CHOLEST SERPL-MCNC: 144 MG/DL
CREAT UR-MCNC: 101 MG/DL
HBA1C MFR BLD: 7.1 % (ref 0–5.6)
HDLC SERPL-MCNC: 44 MG/DL
LDLC SERPL CALC-MCNC: 82 MG/DL
MICROALBUMIN UR-MCNC: <12 MG/L
MICROALBUMIN/CREAT UR: NORMAL MG/G{CREAT}
NONHDLC SERPL-MCNC: 100 MG/DL
PSA SERPL DL<=0.01 NG/ML-MCNC: 0.53 NG/ML (ref 0–3.5)
TRIGL SERPL-MCNC: 88 MG/DL

## 2023-06-12 PROCEDURE — 82570 ASSAY OF URINE CREATININE: CPT | Performed by: FAMILY MEDICINE

## 2023-06-12 PROCEDURE — 36415 COLL VENOUS BLD VENIPUNCTURE: CPT | Performed by: FAMILY MEDICINE

## 2023-06-12 PROCEDURE — G0103 PSA SCREENING: HCPCS | Performed by: FAMILY MEDICINE

## 2023-06-12 PROCEDURE — 80061 LIPID PANEL: CPT | Performed by: FAMILY MEDICINE

## 2023-06-12 PROCEDURE — 83036 HEMOGLOBIN GLYCOSYLATED A1C: CPT | Performed by: FAMILY MEDICINE

## 2023-06-12 PROCEDURE — 99396 PREV VISIT EST AGE 40-64: CPT | Performed by: FAMILY MEDICINE

## 2023-06-12 PROCEDURE — 99213 OFFICE O/P EST LOW 20 MIN: CPT | Mod: 25 | Performed by: FAMILY MEDICINE

## 2023-06-12 PROCEDURE — 82043 UR ALBUMIN QUANTITATIVE: CPT | Performed by: FAMILY MEDICINE

## 2023-06-12 ASSESSMENT — ENCOUNTER SYMPTOMS
DIARRHEA: 0
CHILLS: 0
MYALGIAS: 0
SORE THROAT: 0
DYSURIA: 0
HEMATOCHEZIA: 0
PARESTHESIAS: 0
WEAKNESS: 0
DIZZINESS: 0
FREQUENCY: 0
FEVER: 0
ARTHRALGIAS: 0
JOINT SWELLING: 0
PALPITATIONS: 0
NERVOUS/ANXIOUS: 0
EYE PAIN: 0
ABDOMINAL PAIN: 0
HEARTBURN: 0
HEADACHES: 0
NAUSEA: 0
SHORTNESS OF BREATH: 0
CONSTIPATION: 0
COUGH: 0

## 2023-06-12 ASSESSMENT — PAIN SCALES - GENERAL: PAINLEVEL: NO PAIN (0)

## 2023-06-12 NOTE — PROGRESS NOTES
SUBJECTIVE:   CC: Morro is an 52 year old who presents for preventative health visit.       6/12/2023    10:01 AM   Additional Questions   Roomed by loula   Accompanied by wife     Healthy Habits:    Getting at least 3 servings of Calcium per day:  Yes    Bi-annual eye exam:  NO    Dental care twice a year:  NO    Sleep apnea or symptoms of sleep apnea:  None    Diet:  Regular (no restrictions)    Frequency of exercise:  4-5 days/week    Duration of exercise:  30-45 minutes    Taking medications regularly:  Yes    Medication side effects:  None    PHQ-2 Total Score:    Additional concerns today:  No              Diabetes Follow-up    How often are you checking your blood sugar? A few times a month  What time of day are you checking your blood sugars (select all that apply)?  Before and after meals  Have you had any blood sugars above 200?  No  Have you had any blood sugars below 70?  No    What symptoms do you notice when your blood sugar is low?  None    What concerns do you have today about your diabetes? None     Do you have any of these symptoms? (Select all that apply)  No numbness or tingling in feet.  No redness, sores or blisters on feet.  No complaints of excessive thirst.  No reports of blurry vision.  No significant changes to weight.    Have you had a diabetic eye exam in the last 12 months? Yes- Date of last eye exam: 2/2023,  Location: [facility]        BP Readings from Last 2 Encounters:   06/12/23 127/86   02/20/23 110/74     Hemoglobin A1C (%)   Date Value   02/20/2023 6.9 (H)   06/07/2022 8.0 (H)   04/03/2021 >15.0 (H)     LDL Cholesterol Calculated (mg/dL)   Date Value   06/07/2022 79   04/08/2021 109 (H)           Have you ever done Advance Care Planning? (For example, a Health Directive, POLST, or a discussion with a medical provider or your loved ones about your wishes): No, advance care planning information given to patient to review.  Patient declined advance care planning discussion at this  time.    Social History     Tobacco Use     Smoking status: Every Day     Packs/day: 0.75     Years: 25.00     Pack years: 18.75     Types: Cigarettes     Last attempt to quit: 2021     Years since quittin.9     Smokeless tobacco: Never   Vaping Use     Vaping status: Former     Substances: Nicotine, Flavoring     Devices: Disposable   Substance Use Topics     Alcohol use: Not Currently     Comment: Working too much             2023     9:44 AM   Alcohol Use   Prescreen: >3 drinks/day or >7 drinks/week? No       Last PSA:   Prostate Specific Antigen Screen   Date Value Ref Range Status   2022 0.63 0.00 - 4.00 ug/L Final       Reviewed orders with patient. Reviewed health maintenance and updated orders accordingly - Yes  Patient Active Problem List   Diagnosis     Type 2 diabetes mellitus with hyperglycemia, without long-term current use of insulin (H)     Past Surgical History:   Procedure Laterality Date     AMPUTATION Right     index finger, traumatic injury     LASIK BILATERAL      x 2       Social History     Tobacco Use     Smoking status: Every Day     Packs/day: 0.75     Years: 25.00     Pack years: 18.75     Types: Cigarettes     Last attempt to quit: 2021     Years since quittin.9     Smokeless tobacco: Never   Vaping Use     Vaping status: Former     Substances: Nicotine, Flavoring     Devices: Disposable   Substance Use Topics     Alcohol use: Not Currently     Comment: Working too much     Family History   Problem Relation Age of Onset     Other - See Comments Mother         Not much contact     Diabetes Type 2  Father         Lives in [place]         Current Outpatient Medications   Medication Sig Dispense Refill     blood glucose (ACCU-CHEK GUIDE) test strip Use to test blood sugar 2 times daily or as directed. 100 strip 11     blood glucose monitoring (NO BRAND SPECIFIED) meter device kit Use to test blood sugar as directed. 1 kit 0     blood glucose monitoring (SOFTCLIX)  "lancets Use to test blood sugar 2 times daily or as directed.  Ok to substitute alternative if insurance prefers. 100 each 6     Calcium-Vitamin D 500-3.125 MG-MCG TABS        metFORMIN (GLUCOPHAGE) 1000 MG tablet Take 1 tablet (1,000 mg) by mouth 2 times daily (with meals) 180 tablet 3     No Known Allergies    Reviewed and updated as needed this visit by clinical staff   Tobacco  Allergies  Meds  Problems  Med Hx  Surg Hx  Fam Hx  Soc   Hx        Reviewed and updated as needed this visit by Provider   Tobacco  Allergies  Meds  Problems  Med Hx  Surg Hx  Fam Hx  Soc   Hx           Review of Systems   Constitutional: Negative for chills and fever.   HENT: Negative for congestion, ear pain, hearing loss and sore throat.    Eyes: Negative for pain and visual disturbance.   Respiratory: Negative for cough and shortness of breath.    Cardiovascular: Negative for chest pain, palpitations and peripheral edema.   Gastrointestinal: Negative for abdominal pain, constipation, diarrhea, heartburn, hematochezia and nausea.   Genitourinary: Negative for dysuria, frequency, genital sores, impotence, penile discharge and urgency.   Musculoskeletal: Negative for arthralgias, joint swelling and myalgias.   Skin: Negative for rash.   Neurological: Negative for dizziness, weakness, headaches and paresthesias.   Psychiatric/Behavioral: Negative for mood changes. The patient is not nervous/anxious.          OBJECTIVE:   /86   Pulse 77   Temp 97.4  F (36.3  C) (Temporal)   Resp 17   Ht 1.69 m (5' 6.54\")   Wt 80.5 kg (177 lb 8 oz)   SpO2 97%   BMI 28.19 kg/m      Physical Exam  GENERAL: healthy, alert and no distress  EYES: Eyes grossly normal to inspection, PERRL and conjunctivae and sclerae normal  HENT: ear canals and TM's normal, nose and mouth without ulcers or lesions  NECK: no adenopathy, no asymmetry, masses, or scars and thyroid normal to palpation  RESP: lungs clear to auscultation - no rales, rhonchi " or wheezes  CV: regular rate and rhythm, normal S1 S2, no S3 or S4, no murmur, click or rub, no peripheral edema and peripheral pulses strong  ABDOMEN: soft, nontender, no hepatosplenomegaly, no masses and bowel sounds normal  MS: no gross musculoskeletal defects noted, no edema  SKIN: no suspicious lesions or rashes  NEURO: Normal strength and tone, mentation intact and speech normal  PSYCH: mentation appears normal, affect normal/bright    Diagnostic Test Results:  Labs reviewed in Epic    ASSESSMENT/PLAN:   (Z00.00) Routine general medical examination at a health care facility  (primary encounter diagnosis)  Comment: Routine health issues appropriate for age reviewed.  Follow-up is recommended in 1 year for a wellness visit in 6 months for a diabetes check.  Plan: REVIEW OF HEALTH MAINTENANCE PROTOCOL ORDERS             (E11.65) Type 2 diabetes mellitus with hyperglycemia, without long-term current use of insulin (H)  Comment: We will check laboratory testing as noted today.  No refills were needed.  Statin therapy was discussed with the patient and he will take into consideration but declined starting a new medication today.  Plan: Adult Eye  Referral, HEMOGLOBIN A1C,         Lipid panel reflex to direct LDL Non-fasting,         Albumin Random Urine Quantitative with Creat         Ratio             (Z12.5) Screening for prostate cancer  Comment: Screening for prostate cancer was discussed and done today.  Plan: PSA, screen               Patient has been advised of split billing requirements and indicates understanding: Yes      COUNSELING:   Reviewed preventive health counseling, as reflected in patient instructions       Regular exercise       Healthy diet/nutrition       Vision screening       Hearing screening       Immunizations    Declined: Hepatitis B, Pneumococcal and Zoster due to Concerns about side effects/safety               Colorectal cancer screening       Prostate cancer  "screening      BMI:   Estimated body mass index is 28.19 kg/m  as calculated from the following:    Height as of this encounter: 1.69 m (5' 6.54\").    Weight as of this encounter: 80.5 kg (177 lb 8 oz).         He reports that he has been smoking cigarettes. He has a 18.75 pack-year smoking history. He has never used smokeless tobacco.  Nicotine/Tobacco Cessation Plan:   Information offered: Patient not interested at this time            Nehemias Huffman MD  [facility]  "

## 2023-10-28 ENCOUNTER — HEALTH MAINTENANCE LETTER (OUTPATIENT)
Age: 53
End: 2023-10-28

## 2024-02-18 ENCOUNTER — TRANSFERRED RECORDS (OUTPATIENT)
Dept: MULTI SPECIALTY CLINIC | Facility: CLINIC | Age: 54
End: 2024-02-18
Payer: COMMERCIAL

## 2024-02-18 LAB — RETINOPATHY: NORMAL

## 2024-03-04 ENCOUNTER — OFFICE VISIT (OUTPATIENT)
Dept: FAMILY MEDICINE | Facility: CLINIC | Age: 54
End: 2024-03-04
Payer: COMMERCIAL

## 2024-03-04 VITALS
OXYGEN SATURATION: 98 % | RESPIRATION RATE: 17 BRPM | BODY MASS INDEX: 27.96 KG/M2 | HEART RATE: 76 BPM | SYSTOLIC BLOOD PRESSURE: 119 MMHG | DIASTOLIC BLOOD PRESSURE: 83 MMHG | WEIGHT: 184.5 LBS | HEIGHT: 68 IN | TEMPERATURE: 98.1 F

## 2024-03-04 DIAGNOSIS — Z12.11 SCREEN FOR COLON CANCER: ICD-10-CM

## 2024-03-04 DIAGNOSIS — E11.65 TYPE 2 DIABETES MELLITUS WITH HYPERGLYCEMIA, WITHOUT LONG-TERM CURRENT USE OF INSULIN (H): Primary | ICD-10-CM

## 2024-03-04 LAB — HBA1C MFR BLD: 10.5 % (ref 0–5.6)

## 2024-03-04 PROCEDURE — 99213 OFFICE O/P EST LOW 20 MIN: CPT | Performed by: FAMILY MEDICINE

## 2024-03-04 PROCEDURE — 83036 HEMOGLOBIN GLYCOSYLATED A1C: CPT | Performed by: FAMILY MEDICINE

## 2024-03-04 PROCEDURE — G2211 COMPLEX E/M VISIT ADD ON: HCPCS | Performed by: FAMILY MEDICINE

## 2024-03-04 PROCEDURE — 36415 COLL VENOUS BLD VENIPUNCTURE: CPT | Performed by: FAMILY MEDICINE

## 2024-03-04 PROCEDURE — 80048 BASIC METABOLIC PNL TOTAL CA: CPT | Performed by: FAMILY MEDICINE

## 2024-03-04 PROCEDURE — 99207 PR FOOT EXAM NO CHARGE: CPT | Performed by: FAMILY MEDICINE

## 2024-03-04 ASSESSMENT — PAIN SCALES - GENERAL: PAINLEVEL: NO PAIN (0)

## 2024-03-04 NOTE — PROGRESS NOTES
"  Assessment & Plan     Type 2 diabetes mellitus with hyperglycemia, without long-term current use of insulin (H)  Refilled metformin.  Will check laboratory testing as noted below.  - metFORMIN (GLUCOPHAGE) 1000 MG tablet; Take 1 tablet (1,000 mg) by mouth 2 times daily (with meals)  - HEMOGLOBIN A1C; Future  - BASIC METABOLIC PANEL; Future  - FOOT EXAM  - HEMOGLOBIN A1C  - BASIC METABOLIC PANEL    Screen for colon cancer  FIT testing was given to the patient today.  - Fecal colorectal cancer screen FIT - Future (S+30); Future  - Fecal colorectal cancer screen FIT - Future (S+30)            BMI  Estimated body mass index is 28.45 kg/m  as calculated from the following:    Height as of this encounter: 1.715 m (5' 7.52\").    Weight as of this encounter: 83.7 kg (184 lb 8 oz).   Weight management plan: Discussed healthy diet and exercise guidelines      FUTURE APPOINTMENTS:       - Follow-up visit in 6 months for routine physical    Subjective   Morro is a 53 year old, presenting for the following health issues:  Follow Up        3/4/2024     2:14 PM   Additional Questions   Roomed by Haven Sinha   Accompanied by Wife     Via the Health Maintenance questionnaire, the patient has reported the following services have been completed -Eye Exam, this information has been sent to the abstraction team.  History of Present Illness       Diabetes:   He presents for follow up of diabetes.  He is checking home blood glucose a few times a month.   He checks blood glucose before meals.  Blood glucose is never over 200 and never under 70. He is aware of hypoglycemia symptoms including none.    He has no concerns regarding his diabetes at this time.   He is not experiencing numbness or burning in feet, excessive thirst, blurry vision, weight changes or redness, sores or blisters on feet. The patient has not had a diabetic eye exam in the last 12 months.          He eats 0-1 servings of fruits and vegetables daily.He consumes 1 " "sweetened beverage(s) daily.He exercises with enough effort to increase his heart rate 30 to 60 minutes per day.  He exercises with enough effort to increase his heart rate 7 days per week. He is missing 2 dose(s) of medications per week.           Patient is here today for follow-up of his diabetes.  Contrary to what he noted above he reported to me he had an eye examination last month and was told that things were normal.  He denies any urinary frequency, unintentional weight loss, or polydipsia.  He needs a refill on the metformin.  He has not been checking his sugars outside the office.  No other concerns today.      Review of Systems  Constitutional, HEENT, cardiovascular, pulmonary, gi and gu systems are negative, except as otherwise noted.      Objective    /83 (BP Location: Right arm, Patient Position: Sitting, Cuff Size: Adult Regular)   Pulse 76   Temp 98.1  F (36.7  C) (Temporal)   Resp 17   Ht 1.715 m (5' 7.52\")   Wt 83.7 kg (184 lb 8 oz)   SpO2 98%   BMI 28.45 kg/m    Body mass index is 28.45 kg/m .  Physical Exam   GENERAL: alert and no distress  EYES: Eyes grossly normal to inspection, PERRL and conjunctivae and sclerae normal  NECK: no adenopathy, no asymmetry, masses, or scars  RESP: lungs clear to auscultation - no rales, rhonchi or wheezes  CV: regular rate and rhythm, normal S1 S2, no S3 or S4, no murmur, click or rub, no peripheral edema   MS: no gross musculoskeletal defects noted, no edema  SKIN: no suspicious lesions or rashes  NEURO: Normal strength and tone, mentation intact and speech normal  PSYCH: mentation appears normal, affect normal/bright  Diabetic foot exam: normal DP and PT pulses, no trophic changes or ulcerative lesions, normal sensory exam, and normal monofilament exam    Office Visit on 06/12/2023   Component Date Value Ref Range Status    Hemoglobin A1C 06/12/2023 7.1 (H)  0.0 - 5.6 % Final    Normal <5.7%   Prediabetes 5.7-6.4%    Diabetes 6.5% or higher   "   Note: Adopted from ADA consensus guidelines.    Cholesterol 06/12/2023 144  <200 mg/dL Final    Triglycerides 06/12/2023 88  <150 mg/dL Final    Direct Measure HDL 06/12/2023 44  >=40 mg/dL Final    LDL Cholesterol Calculated 06/12/2023 82  <=100 mg/dL Final    Non HDL Cholesterol 06/12/2023 100  <130 mg/dL Final    Creatinine Urine mg/dL 06/12/2023 101.0  mg/dL Final    The reference ranges have not been established in urine creatinine. The results should be integrated into the clinical context for interpretation.    Albumin Urine mg/L 06/12/2023 <12.0  mg/L Final    The reference ranges have not been established in urine albumin. The results should be integrated into the clinical context for interpretation.    Albumin Urine mg/g Cr 06/12/2023    Final    Unable to calculate, urine albumin and/or urine creatinine is outside detectable limits.  Microalbuminuria is defined as an albumin:creatinine ratio of 17 to 299 for males and 25 to 299 for females. A ratio of albumin:creatinine of 300 or higher is indicative of overt proteinuria.  Due to biologic variability, positive results should be confirmed by a second, first-morning random or 24-hour timed urine specimen. If there is discrepancy, a third specimen is recommended. When 2 out of 3 results are in the microalbuminuria range, this is evidence for incipient nephropathy and warrants increased efforts at glucose control, blood pressure control, and institution of therapy with an angiotensin-converting-enzyme (ACE) inhibitor (if the patient can tolerate it).      Prostate Specific Antigen Screen 06/12/2023 0.53  0.00 - 3.50 ng/mL Final           Signed Electronically by: Nehemias Huffman MD

## 2024-03-05 LAB
ANION GAP SERPL CALCULATED.3IONS-SCNC: 9 MMOL/L (ref 7–15)
BUN SERPL-MCNC: 16.7 MG/DL (ref 6–20)
CALCIUM SERPL-MCNC: 9.7 MG/DL (ref 8.6–10)
CHLORIDE SERPL-SCNC: 102 MMOL/L (ref 98–107)
CREAT SERPL-MCNC: 0.92 MG/DL (ref 0.67–1.17)
DEPRECATED HCO3 PLAS-SCNC: 29 MMOL/L (ref 22–29)
EGFRCR SERPLBLD CKD-EPI 2021: >90 ML/MIN/1.73M2
GLUCOSE SERPL-MCNC: 215 MG/DL (ref 70–99)
POTASSIUM SERPL-SCNC: 4.4 MMOL/L (ref 3.4–5.3)
SODIUM SERPL-SCNC: 140 MMOL/L (ref 135–145)

## 2024-03-08 PROCEDURE — 82274 ASSAY TEST FOR BLOOD FECAL: CPT | Performed by: FAMILY MEDICINE

## 2024-03-11 DIAGNOSIS — E11.65 TYPE 2 DIABETES MELLITUS WITH HYPERGLYCEMIA, WITHOUT LONG-TERM CURRENT USE OF INSULIN (H): Primary | ICD-10-CM

## 2024-03-11 RX ORDER — GLIPIZIDE 10 MG/1
10 TABLET, FILM COATED, EXTENDED RELEASE ORAL DAILY
Qty: 90 TABLET | Refills: 1 | Status: SHIPPED | OUTPATIENT
Start: 2024-03-11 | End: 2024-06-19

## 2024-03-12 LAB — HEMOCCULT STL QL IA: NEGATIVE

## 2024-04-01 DIAGNOSIS — E11.65 TYPE 2 DIABETES MELLITUS WITH HYPERGLYCEMIA, WITHOUT LONG-TERM CURRENT USE OF INSULIN (H): ICD-10-CM

## 2024-06-19 ENCOUNTER — OFFICE VISIT (OUTPATIENT)
Dept: FAMILY MEDICINE | Facility: CLINIC | Age: 54
End: 2024-06-19
Payer: COMMERCIAL

## 2024-06-19 VITALS
HEIGHT: 67 IN | OXYGEN SATURATION: 98 % | SYSTOLIC BLOOD PRESSURE: 135 MMHG | HEART RATE: 85 BPM | WEIGHT: 186 LBS | BODY MASS INDEX: 29.19 KG/M2 | RESPIRATION RATE: 13 BRPM | TEMPERATURE: 98.1 F | DIASTOLIC BLOOD PRESSURE: 84 MMHG

## 2024-06-19 DIAGNOSIS — E11.65 TYPE 2 DIABETES MELLITUS WITH HYPERGLYCEMIA, WITHOUT LONG-TERM CURRENT USE OF INSULIN (H): Primary | ICD-10-CM

## 2024-06-19 DIAGNOSIS — Z23 NEED FOR VACCINATION: ICD-10-CM

## 2024-06-19 DIAGNOSIS — E11.65 TYPE 2 DIABETES MELLITUS WITH HYPERGLYCEMIA, WITHOUT LONG-TERM CURRENT USE OF INSULIN (H): ICD-10-CM

## 2024-06-19 LAB — HBA1C MFR BLD: 6.7 % (ref 0–5.6)

## 2024-06-19 PROCEDURE — 90471 IMMUNIZATION ADMIN: CPT | Performed by: FAMILY MEDICINE

## 2024-06-19 PROCEDURE — 82043 UR ALBUMIN QUANTITATIVE: CPT | Performed by: FAMILY MEDICINE

## 2024-06-19 PROCEDURE — 36415 COLL VENOUS BLD VENIPUNCTURE: CPT | Performed by: FAMILY MEDICINE

## 2024-06-19 PROCEDURE — 99213 OFFICE O/P EST LOW 20 MIN: CPT | Mod: 25 | Performed by: FAMILY MEDICINE

## 2024-06-19 PROCEDURE — 80061 LIPID PANEL: CPT | Performed by: FAMILY MEDICINE

## 2024-06-19 PROCEDURE — 90750 HZV VACC RECOMBINANT IM: CPT | Performed by: FAMILY MEDICINE

## 2024-06-19 PROCEDURE — 82570 ASSAY OF URINE CREATININE: CPT | Performed by: FAMILY MEDICINE

## 2024-06-19 PROCEDURE — 83036 HEMOGLOBIN GLYCOSYLATED A1C: CPT | Performed by: FAMILY MEDICINE

## 2024-06-19 RX ORDER — BLOOD SUGAR DIAGNOSTIC
STRIP MISCELLANEOUS
Qty: 100 STRIP | Refills: 1 | Status: SHIPPED | OUTPATIENT
Start: 2024-06-19

## 2024-06-19 RX ORDER — GLIPIZIDE 10 MG/1
10 TABLET, FILM COATED, EXTENDED RELEASE ORAL DAILY
Qty: 90 TABLET | Refills: 1 | Status: SHIPPED | OUTPATIENT
Start: 2024-06-19 | End: 2024-09-29

## 2024-06-19 ASSESSMENT — PAIN SCALES - GENERAL: PAINLEVEL: NO PAIN (0)

## 2024-06-19 NOTE — PROGRESS NOTES
Assessment & Plan     Type 2 diabetes mellitus with hyperglycemia, without long-term current use of insulin (H)  Will work on getting glipizide.  Appears to be due to pharmacy coverage.  Will have meds switched to [facility] if he is amenable.    - HEMOGLOBIN A1C; Future  - Lipid panel reflex to direct LDL Non-fasting; Future  - Albumin Random Urine Quantitative with Creat Ratio; Future  - REVIEW OF HEALTH MAINTENANCE PROTOCOL ORDERS  - HEMOGLOBIN A1C  - Lipid panel reflex to direct LDL Non-fasting  - Albumin Random Urine Quantitative with Creat Ratio    Need for vaccination  - ZOSTER RECOMBINANT ADJUVANTED (SHINGRIX)          Nicotine/Tobacco Cessation  He reports that he has been smoking cigarettes. He started smoking about 27 years ago. He has a 12.5 pack-year smoking history. He has never used smokeless tobacco.  Nicotine/Tobacco Cessation Plan      Camilo Hooker is a 53 year old, presenting for the following health issues:  Diabetes        6/19/2024     2:42 PM   Additional Questions   Roomed by Haven TAYLOR   Accompanied by Iris - Wife     History of Present Illness       Diabetes:   He presents for follow up of diabetes.  He is checking home blood glucose a few times a month.   He checks blood glucose before meals.  Blood glucose is never over 200 and never under 70.  When his blood glucose is low, the patient is asymptomatic for confusion, blurred vision, lethargy and reports not feeling dizzy, shaky, or weak.   He has no concerns regarding his diabetes at this time.   He is not experiencing numbness or burning in feet, excessive thirst, blurry vision, weight changes or redness, sores or blisters on feet.           He eats 0-1 servings of fruits and vegetables daily.He consumes 1 sweetened beverage(s) daily.He exercises with enough effort to increase his heart rate 30 to 60 minutes per day.  He exercises with enough effort to increase his heart rate 6 days per week. He is missing 1 dose(s) of medications per  "week.  He is not taking prescribed medications regularly due to remembering to take.       Last A1c was high  Now started glipizide  Couldn't get it for the last month     Last eye exam several months ago             Objective    /84 (BP Location: Right arm, Patient Position: Sitting, Cuff Size: Adult Regular)   Pulse 85   Temp 98.1  F (36.7  C) (Temporal)   Resp 13   Ht 1.689 m (5' 6.5\")   Wt 84.4 kg (186 lb)   SpO2 98%   BMI 29.57 kg/m    Body mass index is 29.57 kg/m .  Physical Exam  Constitutional:       General: He is not in acute distress.  Eyes:      General: No scleral icterus.  Cardiovascular:      Rate and Rhythm: Normal rate and regular rhythm.      Heart sounds: Normal heart sounds.   Pulmonary:      Effort: No respiratory distress.   Neurological:      Mental Status: He is alert.   Psychiatric:         Mood and Affect: Mood normal.         Behavior: Behavior normal.                Signed Electronically by: Remigio Chi DO    "

## 2024-06-19 NOTE — TELEPHONE ENCOUNTER
Refill requests for all medication to be sent to [place] [facility]. Pt states there is an issue getting filled there though    Spoke to [place] [facility] - patient's insurance preference is [facility], [place] is unable to fill medication.   If patient wants filled at a non [facility] pharmacies, will have to pay out of pocket. Advised Pt to call insurance company for further detail.     Informed Pt of situation. Verbalized understanding and will fill meds at [facility] in [facility]  - [address]     Order pended and routed to refill pool    Rosamaria JEFFERY RN  [facility]

## 2024-06-20 LAB
CHOLEST SERPL-MCNC: 165 MG/DL
CREAT UR-MCNC: 75.3 MG/DL
FASTING STATUS PATIENT QL REPORTED: NO
HDLC SERPL-MCNC: 39 MG/DL
LDLC SERPL CALC-MCNC: 96 MG/DL
MICROALBUMIN UR-MCNC: <12 MG/L
MICROALBUMIN/CREAT UR: NORMAL MG/G{CREAT}
NONHDLC SERPL-MCNC: 126 MG/DL
TRIGL SERPL-MCNC: 152 MG/DL

## 2024-08-03 ENCOUNTER — HEALTH MAINTENANCE LETTER (OUTPATIENT)
Age: 54
End: 2024-08-03

## 2024-09-28 DIAGNOSIS — E11.65 TYPE 2 DIABETES MELLITUS WITH HYPERGLYCEMIA, WITHOUT LONG-TERM CURRENT USE OF INSULIN (H): ICD-10-CM

## 2024-09-29 RX ORDER — GLIPIZIDE 10 MG/1
10 TABLET, FILM COATED, EXTENDED RELEASE ORAL DAILY
Qty: 90 TABLET | Refills: 1 | Status: SHIPPED | OUTPATIENT
Start: 2024-09-29

## 2024-10-12 ENCOUNTER — HEALTH MAINTENANCE LETTER (OUTPATIENT)
Age: 54
End: 2024-10-12

## 2024-11-14 DIAGNOSIS — E11.65 TYPE 2 DIABETES MELLITUS WITH HYPERGLYCEMIA, WITHOUT LONG-TERM CURRENT USE OF INSULIN (H): ICD-10-CM

## 2024-11-14 RX ORDER — BLOOD SUGAR DIAGNOSTIC
STRIP MISCELLANEOUS
Qty: 100 STRIP | Refills: 1 | Status: SHIPPED | OUTPATIENT
Start: 2024-11-14

## 2025-01-25 ENCOUNTER — HEALTH MAINTENANCE LETTER (OUTPATIENT)
Age: 55
End: 2025-01-25

## 2025-02-10 ENCOUNTER — TRANSFERRED RECORDS (OUTPATIENT)
Dept: MULTI SPECIALTY CLINIC | Facility: CLINIC | Age: 55
End: 2025-02-10

## 2025-02-10 LAB — RETINOPATHY: NORMAL

## 2025-02-25 DIAGNOSIS — E11.65 TYPE 2 DIABETES MELLITUS WITH HYPERGLYCEMIA, WITHOUT LONG-TERM CURRENT USE OF INSULIN (H): ICD-10-CM

## 2025-02-25 RX ORDER — BLOOD SUGAR DIAGNOSTIC
STRIP MISCELLANEOUS
Qty: 200 STRIP | Refills: 0 | Status: SHIPPED | OUTPATIENT
Start: 2025-02-25

## 2025-05-03 ENCOUNTER — HEALTH MAINTENANCE LETTER (OUTPATIENT)
Age: 55
End: 2025-05-03

## 2025-08-13 ENCOUNTER — OFFICE VISIT (OUTPATIENT)
Dept: FAMILY MEDICINE | Facility: CLINIC | Age: 55
End: 2025-08-13
Payer: COMMERCIAL

## 2025-08-13 ENCOUNTER — ORDERS ONLY (AUTO-RELEASED) (OUTPATIENT)
Dept: FAMILY MEDICINE | Facility: CLINIC | Age: 55
End: 2025-08-13

## 2025-08-13 VITALS
WEIGHT: 180 LBS | OXYGEN SATURATION: 96 % | TEMPERATURE: 97.6 F | SYSTOLIC BLOOD PRESSURE: 128 MMHG | HEART RATE: 102 BPM | DIASTOLIC BLOOD PRESSURE: 87 MMHG | BODY MASS INDEX: 28.93 KG/M2 | HEIGHT: 66 IN | RESPIRATION RATE: 30 BRPM

## 2025-08-13 DIAGNOSIS — Z12.11 SCREEN FOR COLON CANCER: ICD-10-CM

## 2025-08-13 DIAGNOSIS — Z13.6 SCREENING FOR CARDIOVASCULAR CONDITION: ICD-10-CM

## 2025-08-13 DIAGNOSIS — Z12.11 SCREEN FOR COLON CANCER: Primary | ICD-10-CM

## 2025-08-13 DIAGNOSIS — E11.65 TYPE 2 DIABETES MELLITUS WITH HYPERGLYCEMIA, WITHOUT LONG-TERM CURRENT USE OF INSULIN (H): ICD-10-CM

## 2025-08-13 LAB
EST. AVERAGE GLUCOSE BLD GHB EST-MCNC: 183 MG/DL
HBA1C MFR BLD: 8 % (ref 0–5.6)

## 2025-08-13 PROCEDURE — 3079F DIAST BP 80-89 MM HG: CPT | Performed by: FAMILY MEDICINE

## 2025-08-13 PROCEDURE — 82570 ASSAY OF URINE CREATININE: CPT | Performed by: FAMILY MEDICINE

## 2025-08-13 PROCEDURE — 80048 BASIC METABOLIC PNL TOTAL CA: CPT | Performed by: FAMILY MEDICINE

## 2025-08-13 PROCEDURE — 3074F SYST BP LT 130 MM HG: CPT | Performed by: FAMILY MEDICINE

## 2025-08-13 PROCEDURE — 36415 COLL VENOUS BLD VENIPUNCTURE: CPT | Performed by: FAMILY MEDICINE

## 2025-08-13 PROCEDURE — 1126F AMNT PAIN NOTED NONE PRSNT: CPT | Performed by: FAMILY MEDICINE

## 2025-08-13 PROCEDURE — 83036 HEMOGLOBIN GLYCOSYLATED A1C: CPT | Performed by: FAMILY MEDICINE

## 2025-08-13 PROCEDURE — 99207 PR FOOT EXAM NO CHARGE: CPT | Performed by: FAMILY MEDICINE

## 2025-08-13 PROCEDURE — 82043 UR ALBUMIN QUANTITATIVE: CPT | Performed by: FAMILY MEDICINE

## 2025-08-13 PROCEDURE — 80061 LIPID PANEL: CPT | Performed by: FAMILY MEDICINE

## 2025-08-13 PROCEDURE — 99214 OFFICE O/P EST MOD 30 MIN: CPT | Performed by: FAMILY MEDICINE

## 2025-08-13 RX ORDER — ATORVASTATIN CALCIUM 20 MG/1
20 TABLET, FILM COATED ORAL DAILY
Qty: 90 TABLET | Refills: 3 | Status: SHIPPED | OUTPATIENT
Start: 2025-08-13

## 2025-08-13 RX ORDER — GLIPIZIDE 10 MG/1
10 TABLET, FILM COATED, EXTENDED RELEASE ORAL DAILY
Qty: 90 TABLET | Refills: 3 | Status: SHIPPED | OUTPATIENT
Start: 2025-08-13

## 2025-08-13 ASSESSMENT — PAIN SCALES - GENERAL: PAINLEVEL_OUTOF10: NO PAIN (0)

## 2025-08-14 LAB
ANION GAP SERPL CALCULATED.3IONS-SCNC: 13 MMOL/L (ref 7–15)
BUN SERPL-MCNC: 12.7 MG/DL (ref 6–20)
CALCIUM SERPL-MCNC: 9.5 MG/DL (ref 8.8–10.4)
CHLORIDE SERPL-SCNC: 104 MMOL/L (ref 98–107)
CHOLEST SERPL-MCNC: 179 MG/DL
CREAT SERPL-MCNC: 1.01 MG/DL (ref 0.67–1.17)
CREAT UR-MCNC: 128 MG/DL
EGFRCR SERPLBLD CKD-EPI 2021: 88 ML/MIN/1.73M2
FASTING STATUS PATIENT QL REPORTED: NO
FASTING STATUS PATIENT QL REPORTED: NO
GLUCOSE SERPL-MCNC: 178 MG/DL (ref 70–99)
HCO3 SERPL-SCNC: 25 MMOL/L (ref 22–29)
HDLC SERPL-MCNC: 36 MG/DL
LDLC SERPL CALC-MCNC: 86 MG/DL
MICROALBUMIN UR-MCNC: <12 MG/L
MICROALBUMIN/CREAT UR: NORMAL MG/G{CREAT}
NONHDLC SERPL-MCNC: 143 MG/DL
POTASSIUM SERPL-SCNC: 4.2 MMOL/L (ref 3.4–5.3)
SODIUM SERPL-SCNC: 142 MMOL/L (ref 135–145)
TRIGL SERPL-MCNC: 283 MG/DL

## 2025-08-16 ENCOUNTER — HEALTH MAINTENANCE LETTER (OUTPATIENT)
Age: 55
End: 2025-08-16